# Patient Record
Sex: MALE | Race: WHITE | Employment: FULL TIME | URBAN - METROPOLITAN AREA
[De-identification: names, ages, dates, MRNs, and addresses within clinical notes are randomized per-mention and may not be internally consistent; named-entity substitution may affect disease eponyms.]

---

## 2018-05-15 ENCOUNTER — OFFICE VISIT (OUTPATIENT)
Dept: VASCULAR SURGERY | Age: 63
End: 2018-05-15
Payer: COMMERCIAL

## 2018-05-15 ENCOUNTER — HOSPITAL ENCOUNTER (OUTPATIENT)
Dept: CARDIOLOGY | Age: 63
Discharge: HOME | End: 2018-05-15
Payer: COMMERCIAL

## 2018-05-15 DIAGNOSIS — I71.40 AAA (ABDOMINAL AORTIC ANEURYSM) WITHOUT RUPTURE: Primary | ICD-10-CM

## 2018-05-15 DIAGNOSIS — I71.40 ABDOMINAL AORTIC ANEURYSM WITHOUT RUPTURE: ICD-10-CM

## 2018-05-15 PROCEDURE — 99213 OFFICE O/P EST LOW 20 MIN: CPT | Performed by: SURGERY

## 2018-05-15 PROCEDURE — 93978 VASCULAR STUDY: CPT

## 2018-05-15 RX ORDER — LEVOTHYROXINE SODIUM 0.05 MG/1
75 TABLET ORAL DAILY
Refills: 1 | COMMUNITY
Start: 2018-03-13

## 2018-09-19 ENCOUNTER — HOSPITAL ENCOUNTER (OUTPATIENT)
Age: 63
Discharge: HOME | End: 2018-09-21
Payer: COMMERCIAL

## 2018-09-19 LAB
ALBUMIN SERPL-MCNC: 4.2 G/DL (ref 3.5–5.2)
ALP BLD-CCNC: 47 U/L (ref 40–129)
ALT SERPL-CCNC: 86 U/L (ref 0–40)
ANION GAP SERPL CALCULATED.3IONS-SCNC: 17 MMOL/L (ref 7–16)
AST SERPL-CCNC: 69 U/L (ref 0–39)
BASOPHILS ABSOLUTE: 0.08 E9/L (ref 0–0.2)
BASOPHILS RELATIVE PERCENT: 1 % (ref 0–2)
BILIRUB SERPL-MCNC: <0.2 MG/DL (ref 0–1.2)
BUN BLDV-MCNC: 16 MG/DL (ref 8–23)
CALCIUM SERPL-MCNC: 8.8 MG/DL (ref 8.6–10.2)
CHLORIDE BLD-SCNC: 102 MMOL/L (ref 98–107)
CHOLESTEROL, TOTAL: 156 MG/DL (ref 0–199)
CO2: 23 MMOL/L (ref 22–29)
CREAT SERPL-MCNC: 0.9 MG/DL (ref 0.7–1.2)
EOSINOPHILS ABSOLUTE: 0.19 E9/L (ref 0.05–0.5)
EOSINOPHILS RELATIVE PERCENT: 2.3 % (ref 0–6)
GFR AFRICAN AMERICAN: >60
GFR NON-AFRICAN AMERICAN: >60 ML/MIN/1.73
GLUCOSE BLD-MCNC: 116 MG/DL (ref 74–109)
HCT VFR BLD CALC: 48.8 % (ref 37–54)
HDLC SERPL-MCNC: 36 MG/DL
HEMOGLOBIN: 16.2 G/DL (ref 12.5–16.5)
IMMATURE GRANULOCYTES #: 0.06 E9/L
IMMATURE GRANULOCYTES %: 0.7 % (ref 0–5)
LDL CHOLESTEROL CALCULATED: 87 MG/DL (ref 0–99)
LYMPHOCYTES ABSOLUTE: 2.42 E9/L (ref 1.5–4)
LYMPHOCYTES RELATIVE PERCENT: 29.9 % (ref 20–42)
MCH RBC QN AUTO: 33.3 PG (ref 26–35)
MCHC RBC AUTO-ENTMCNC: 33.2 % (ref 32–34.5)
MCV RBC AUTO: 100.4 FL (ref 80–99.9)
MONOCYTES ABSOLUTE: 0.72 E9/L (ref 0.1–0.95)
MONOCYTES RELATIVE PERCENT: 8.9 % (ref 2–12)
NEUTROPHILS ABSOLUTE: 4.62 E9/L (ref 1.8–7.3)
NEUTROPHILS RELATIVE PERCENT: 57.2 % (ref 43–80)
PDW BLD-RTO: 12.6 FL (ref 11.5–15)
PLATELET # BLD: 225 E9/L (ref 130–450)
PMV BLD AUTO: 10.1 FL (ref 7–12)
POTASSIUM SERPL-SCNC: 4.5 MMOL/L (ref 3.5–5)
PROSTATE SPECIFIC ANTIGEN: 5.63 NG/ML (ref 0–4)
RBC # BLD: 4.86 E12/L (ref 3.8–5.8)
SODIUM BLD-SCNC: 142 MMOL/L (ref 132–146)
TOTAL PROTEIN: 6.8 G/DL (ref 6.4–8.3)
TRIGL SERPL-MCNC: 167 MG/DL (ref 0–149)
TSH SERPL DL<=0.05 MIU/L-ACNC: 3.21 UIU/ML (ref 0.27–4.2)
VITAMIN D 25-HYDROXY: 39 NG/ML (ref 30–100)
VLDLC SERPL CALC-MCNC: 33 MG/DL
WBC # BLD: 8.1 E9/L (ref 4.5–11.5)

## 2018-09-19 PROCEDURE — 84443 ASSAY THYROID STIM HORMONE: CPT

## 2018-09-19 PROCEDURE — 80061 LIPID PANEL: CPT

## 2018-09-19 PROCEDURE — 85025 COMPLETE CBC W/AUTO DIFF WBC: CPT

## 2018-09-19 PROCEDURE — 80053 COMPREHEN METABOLIC PANEL: CPT

## 2018-09-19 PROCEDURE — 82306 VITAMIN D 25 HYDROXY: CPT

## 2018-09-19 PROCEDURE — G0103 PSA SCREENING: HCPCS

## 2019-03-27 ENCOUNTER — HOSPITAL ENCOUNTER (OUTPATIENT)
Age: 64
Discharge: HOME | End: 2019-03-29
Payer: COMMERCIAL

## 2019-03-27 LAB
ALBUMIN SERPL-MCNC: 4.4 G/DL (ref 3.5–5.2)
ALP BLD-CCNC: 49 U/L (ref 40–129)
ALT SERPL-CCNC: 24 U/L (ref 0–40)
ANION GAP SERPL CALCULATED.3IONS-SCNC: 18 MMOL/L (ref 7–16)
AST SERPL-CCNC: 20 U/L (ref 0–39)
BASOPHILS ABSOLUTE: 0.07 E9/L (ref 0–0.2)
BASOPHILS RELATIVE PERCENT: 0.8 % (ref 0–2)
BILIRUB SERPL-MCNC: 0.6 MG/DL (ref 0–1.2)
BUN BLDV-MCNC: 13 MG/DL (ref 8–23)
CALCIUM SERPL-MCNC: 8.8 MG/DL (ref 8.6–10.2)
CHLORIDE BLD-SCNC: 105 MMOL/L (ref 98–107)
CHOLESTEROL, TOTAL: 206 MG/DL (ref 0–199)
CO2: 22 MMOL/L (ref 22–29)
CREAT SERPL-MCNC: 1.1 MG/DL (ref 0.7–1.2)
EOSINOPHILS ABSOLUTE: 0.25 E9/L (ref 0.05–0.5)
EOSINOPHILS RELATIVE PERCENT: 2.9 % (ref 0–6)
GFR AFRICAN AMERICAN: >60
GFR NON-AFRICAN AMERICAN: >60 ML/MIN/1.73
GLUCOSE BLD-MCNC: 113 MG/DL (ref 74–99)
HCT VFR BLD CALC: 50.1 % (ref 37–54)
HDLC SERPL-MCNC: 39 MG/DL
HEMOGLOBIN: 16.6 G/DL (ref 12.5–16.5)
IMMATURE GRANULOCYTES #: 0.09 E9/L
IMMATURE GRANULOCYTES %: 1.1 % (ref 0–5)
LDL CHOLESTEROL CALCULATED: 137 MG/DL (ref 0–99)
LYMPHOCYTES ABSOLUTE: 2.57 E9/L (ref 1.5–4)
LYMPHOCYTES RELATIVE PERCENT: 30.1 % (ref 20–42)
MCH RBC QN AUTO: 33.7 PG (ref 26–35)
MCHC RBC AUTO-ENTMCNC: 33.1 % (ref 32–34.5)
MCV RBC AUTO: 101.6 FL (ref 80–99.9)
MONOCYTES ABSOLUTE: 0.7 E9/L (ref 0.1–0.95)
MONOCYTES RELATIVE PERCENT: 8.2 % (ref 2–12)
NEUTROPHILS ABSOLUTE: 4.85 E9/L (ref 1.8–7.3)
NEUTROPHILS RELATIVE PERCENT: 56.9 % (ref 43–80)
PDW BLD-RTO: 13.1 FL (ref 11.5–15)
PLATELET # BLD: 247 E9/L (ref 130–450)
PMV BLD AUTO: 10.4 FL (ref 7–12)
POTASSIUM SERPL-SCNC: 4.4 MMOL/L (ref 3.5–5)
PROSTATE SPECIFIC ANTIGEN: 5.79 NG/ML (ref 0–4)
RBC # BLD: 4.93 E12/L (ref 3.8–5.8)
SODIUM BLD-SCNC: 145 MMOL/L (ref 132–146)
TOTAL PROTEIN: 7 G/DL (ref 6.4–8.3)
TRIGL SERPL-MCNC: 151 MG/DL (ref 0–149)
TSH SERPL DL<=0.05 MIU/L-ACNC: 2.53 UIU/ML (ref 0.27–4.2)
VITAMIN D 25-HYDROXY: 41 NG/ML (ref 30–100)
VLDLC SERPL CALC-MCNC: 30 MG/DL
WBC # BLD: 8.5 E9/L (ref 4.5–11.5)

## 2019-03-27 PROCEDURE — 80061 LIPID PANEL: CPT

## 2019-03-27 PROCEDURE — 85025 COMPLETE CBC W/AUTO DIFF WBC: CPT

## 2019-03-27 PROCEDURE — 80053 COMPREHEN METABOLIC PANEL: CPT

## 2019-03-27 PROCEDURE — 82306 VITAMIN D 25 HYDROXY: CPT

## 2019-03-27 PROCEDURE — 84443 ASSAY THYROID STIM HORMONE: CPT

## 2019-03-27 PROCEDURE — G0103 PSA SCREENING: HCPCS

## 2019-05-02 ENCOUNTER — HOSPITAL ENCOUNTER (OUTPATIENT)
Age: 64
Discharge: HOME | End: 2019-05-04
Payer: COMMERCIAL

## 2019-05-02 LAB — PROSTATE SPECIFIC ANTIGEN: 6.2 NG/ML (ref 0–4)

## 2019-05-02 PROCEDURE — 84153 ASSAY OF PSA TOTAL: CPT

## 2019-05-21 ENCOUNTER — HOSPITAL ENCOUNTER (OUTPATIENT)
Dept: CARDIOLOGY | Age: 64
Discharge: HOME | End: 2019-05-21
Payer: COMMERCIAL

## 2019-05-21 ENCOUNTER — OFFICE VISIT (OUTPATIENT)
Dept: VASCULAR SURGERY | Age: 64
End: 2019-05-21
Payer: COMMERCIAL

## 2019-05-21 DIAGNOSIS — I71.40 ABDOMINAL AORTIC ANEURYSM WITHOUT RUPTURE: Primary | ICD-10-CM

## 2019-05-21 DIAGNOSIS — I71.40 AAA (ABDOMINAL AORTIC ANEURYSM) WITHOUT RUPTURE: ICD-10-CM

## 2019-05-21 PROCEDURE — 93978 VASCULAR STUDY: CPT

## 2019-05-21 PROCEDURE — 99213 OFFICE O/P EST LOW 20 MIN: CPT | Performed by: SURGERY

## 2019-05-21 NOTE — PROGRESS NOTES
[facility] - [facility]    This is a pre read worksheet - prior to official physician interpretation    Purnima Magallon  1955  Date of study: 5/21/19    Indication for study:  AAA  Study :  Aortic Ultrasound    Diameter Aorta:     Proximal:   cm     Mid:   cm     Distal:  4.0 x 4.1 cm     Right iliac: 1.4 x 1.4 cm     Left iliac: 1.3 x 1.3 cm    Additional comments:       LAST STUDY  5/15/2018  3.9 cm  Rt 1.3  Lt 1.3

## 2019-05-21 NOTE — PROGRESS NOTES
Vascular Surgery Outpatient Progress Note      Chief Complaint   Patient presents with    Circulatory Problem     Follow up AAA       HISTORY OF PRESENT ILLNESS:                The patient is a 70 y.o. male who returns for follow-up evaluation of his abdominal aortic aneurysm. He denies any recent hospital admission, major illness, or surgery since the last office visit. He is currently undergoing workup for urological issues. Past Medical History:        Diagnosis Date    Full dentures     Hyperlipidemia     Vitamin D deficiency      Past Surgical History:        Procedure Laterality Date    COLONOSCOPY      TONSILLECTOMY      TOOTH EXTRACTION      WISDOM TOOTH EXTRACTION       Current Medications:   Prior to Admission medications    Medication Sig Start Date End Date Taking? Authorizing Provider   levothyroxine (SYNTHROID) 50 MCG tablet Take 50 mcg by mouth daily  3/13/18  Yes Historical Provider, MD   simvastatin (ZOCOR) 20 MG tablet Take 20 mg by mouth daily  4/7/16  Yes Historical Provider, MD   vitamin D 1000 units CAPS Take 1,000 Units by mouth daily    Yes Historical Provider, MD   Omega-3 Fatty Acids (FISH OIL) 1000 MG CPDR Take 1,000 mg by mouth    Historical Provider, MD   aspirin EC 81 MG EC tablet Take 81 mg by mouth as needed for Pain    Historical Provider, MD     Allergies:  Patient has no known allergies.     Social History     Socioeconomic History    Marital status:      Spouse name: Not on file    Number of children: Not on file    Years of education: Not on file    Highest education level: Not on file   Occupational History    Not on file   Social Needs    Financial resource strain: Not on file    Food insecurity:     Worry: Not on file     Inability: Not on file    Transportation needs:     Medical: Not on file     Non-medical: Not on file   Tobacco Use    Smoking status: Current Every Day Smoker     Packs/day: 0.50     Types: Cigarettes    Smokeless tobacco: Never Used   Substance and Sexual Activity    Alcohol use: Yes     Comment: 2-3 beers per day    Drug use: Not on file    Sexual activity: Not on file   Lifestyle    Physical activity:     Days per week: Not on file     Minutes per session: Not on file    Stress: Not on file   Relationships    Social connections:     Talks on phone: Not on file     Gets together: Not on file     Attends Advent service: Not on file     Active member of club or organization: Not on file     Attends meetings of clubs or organizations: Not on file     Relationship status: Not on file    Intimate partner violence:     Fear of current or ex partner: Not on file     Emotionally abused: Not on file     Physically abused: Not on file     Forced sexual activity: Not on file   Other Topics Concern    Not on file   Social History Narrative    Not on file        Family History   Problem Relation Age of Onset    Heart Disease Mother     Other Father         MS    Cancer Sister 72       REVIEW OF SYSTEMS (New symptoms):    Eyes:      Blurred vision:  No [x]/Yes []               Diplopia:   No [x]/Yes []               Vision loss:       No [x]/Yes []   Ears, nose, throat:             Hearing loss:    No [x]/Yes []      Vertigo:   No [x]/Yes []                       Swallowing problem:  No [x]/Yes []               Nose bleeds:   No [x]/Yes []      Voice hoarseness:  No [x]/Yes []  Respiratory:             Cough:   No [x]/Yes []      Pleuritic chest pain:  No [x]/Yes []                        Dyspnea:   No [x]/Yes []      Wheezing:   No [x]/Yes []  Cardiovascular:             Angina:   No [x]/Yes []      Palpitations:   No [x]/Yes []          Claudication:    No [x]/Yes []      Leg swelling:   No [x]/Yes []  Gastrointestinal:             Nausea or vomiting:  No [x]/Yes []               Abdominal pain:  No [x]/Yes []                     Intestinal bleeding: No [x]/Yes []  Musculoskeletal:             Leg pain:   No [x]/Yes []      Back pain: No [x]/Yes []                    Weakness:   No [x]/Yes []  Neurologic:             Numbness:   No [x]/Yes []      Paralysis:   No [x]/Yes []                       Headaches:   No [x]/Yes []  Hematologic, lymphatic:   Anemia:   No [x]/Yes []              Bleeding or bruising:  No [x]/Yes []              Fevers or chills: No [x]/Yes []  Endocrine:             Temp intolerance:   No [x]/Yes []                       Polydipsia, polyuria:  No [x]/Yes []  Skin:              Rash:    No [x]/Yes []      Ulcers:   No [x]/Yes []              Abnorm pigment: No [x]/Yes []  :              Frequency/urgency:  No [x]/Yes []      Hematuria:    No []/Yes [x]                      Incontinence:    No [x]/Yes []    PHYSICAL EXAM:  There were no vitals filed for this visit. General Appearance: alert and oriented to person, place and time, in no acute distress, well developed and well- nourished  Neurologic: no cranial nerve deficit, speech normal  Head: normocephalic and atraumatic  Eyes: extraocular eye movements intact, conjunctivae normal  ENT: external ear and ear canal normal bilaterally, nose without deformity  Pulmonary/Chest: normal air movement, no respiratory distress  Cardiovascular: normal rate, regular rhythm  Abdomen: non-distended, no masses  Musculoskeletal: no joint deformity or tenderness  Extremities: no leg edema bilaterally  Skin: warm and dry, no rash or erythema    PULSE EXAM      Right      Left   Brachial     Radial 3 3   Femoral     Popliteal     Dorsalis Pedis     Posterior Tibial 3 3   (3=normal, 2=diminished, 1=barely palpable, 4=widened)    RADIOLOGY: SA today  AAA 4.1 cm  R IA 1.4 cm  L IA 1.3 cm     Problem List Items Addressed This Visit     Abdominal aortic aneurysm without rupture (HCC) - Primary    Relevant Orders    US DUP ABD PEL RETRO SCROT LIMITED        I reviewed the results of the testing with the patient. Stable abdominal aortic aneurysm.  I will plan to see him back in one year with

## 2019-06-14 ENCOUNTER — HOSPITAL ENCOUNTER (OUTPATIENT)
Age: 64
Discharge: HOME | End: 2019-06-16

## 2019-06-14 PROCEDURE — 88305 TISSUE EXAM BY PATHOLOGIST: CPT

## 2020-01-28 ENCOUNTER — HOSPITAL ENCOUNTER (OUTPATIENT)
Dept: RADIATION ONCOLOGY | Age: 65
Discharge: HOME | End: 2020-01-28
Payer: COMMERCIAL

## 2020-01-28 ENCOUNTER — TELEPHONE (OUTPATIENT)
Dept: CASE MANAGEMENT | Age: 65
End: 2020-01-28

## 2020-01-28 VITALS
OXYGEN SATURATION: 94 % | HEART RATE: 66 BPM | TEMPERATURE: 98.3 F | RESPIRATION RATE: 18 BRPM | SYSTOLIC BLOOD PRESSURE: 120 MMHG | DIASTOLIC BLOOD PRESSURE: 84 MMHG | WEIGHT: 191 LBS

## 2020-01-28 PROCEDURE — 99205 OFFICE O/P NEW HI 60 MIN: CPT | Performed by: RADIOLOGY

## 2020-01-28 PROCEDURE — 99205 OFFICE O/P NEW HI 60 MIN: CPT

## 2020-01-28 NOTE — PROGRESS NOTES
Jessica Drought  1955 75 y.o. Referring Physician: Dr Ana Morris    PCP: Ly Conroy,      There were no vitals filed for this visit. Wt Readings from Last 3 Encounters:   No data found for Wt        There is no height or weight on file to calculate BMI. Chief Complaint: No chief complaint on file. Cancer Staging  No matching staging information was found for the patient. Prior Radiation Therapy? NO    Concurrent Chemo/radiation? NO    Prior Chemotherapy? NO    Prior Hormonal Therapy? NO    Head and Neck Cancer? No, patient does NOT have HN cancer. Current Outpatient Medications   Medication Sig Dispense Refill    levothyroxine (SYNTHROID) 50 MCG tablet Take 50 mcg by mouth daily   1    simvastatin (ZOCOR) 20 MG tablet Take 20 mg by mouth daily   0    vitamin D 1000 units CAPS Take 1,000 Units by mouth daily        No current facility-administered medications for this encounter.         Past Medical History:   Diagnosis Date    Full dentures     Hyperlipidemia     Vitamin D deficiency        Past Surgical History:   Procedure Laterality Date    COLONOSCOPY      TONSILLECTOMY      TOOTH EXTRACTION      WISDOM TOOTH EXTRACTION         Family History   Problem Relation Age of Onset    Heart Disease Mother     Other Father         MS    Cancer Sister 72       Social History     Socioeconomic History    Marital status:      Spouse name: Not on file    Number of children: Not on file    Years of education: Not on file    Highest education level: Not on file   Occupational History    Not on file   Social Needs    Financial resource strain: Not on file    Food insecurity:     Worry: Not on file     Inability: Not on file    Transportation needs:     Medical: Not on file     Non-medical: Not on file   Tobacco Use    Smoking status: Current Every Day Smoker     Packs/day: 0.50     Types: Cigarettes    Smokeless tobacco: Never Used    Tobacco comment: 1-2 cigs in the am-the rest in the evening with beer   Substance and Sexual Activity    Alcohol use: Yes     Comment: 2-3 beers per day    Drug use: Never    Sexual activity: Not on file   Lifestyle    Physical activity:     Days per week: Not on file     Minutes per session: Not on file    Stress: Not on file   Relationships    Social connections:     Talks on phone: Not on file     Gets together: Not on file     Attends Tenriism service: Not on file     Active member of club or organization: Not on file     Attends meetings of clubs or organizations: Not on file     Relationship status: Not on file    Intimate partner violence:     Fear of current or ex partner: Not on file     Emotionally abused: Not on file     Physically abused: Not on file     Forced sexual activity: Not on file   Other Topics Concern    Not on file   Social History Narrative    Not on file     IPSS Questionnaire (AUA-7): Over the past month    1)  How often have you had a sensation of not emptying your bladder completely after you finish urinating? 1 - Less than 1 time in 5   2)  How often have you had to urinate again less than two hours after you finished urinating? 1 - Less than 1 time in 5   3)  How often have you found you stopped and started again several times when you urinated? 1 - Less than 1 time in 5   4) How difficult have you found it to postpone urination? 0 - Not at all   5) How often have you had a weak urinary stream?  0 - Not at all   6) How often have you had to push or strain to begin urination? 0 - Not at all   7) How many times did you most typically get up to urinate from the time you went to bed until the time you got up in the morning?   1 - 1 time   Total score:  0-7 mildly symptomatic    8-19 moderately symptomatic    20-35 severely symptomatic         Occupation:  at [employer]  Retired:  NO        REVIEW OF SYSTEMS: <<For Level 5, 10 or more systems>> TOTAL 0          Score of 0-1: No action  Score 2 or greater:  · For Non-Diabetic Patient: Recommend adding Ensure Complete 2 x daily and provide patient with Ensure wellness bag with coupons  · For Diabetic Patient: Recommend adding Glucerna Shake 2 x daily and provide patient with Glucerna Wellness bag with coupons  · Route to the dietitian via [address]    · Are you having  difficulty performing daily routine tasks  due to fatigue or weakness (ie: bathing/showering, dressing, housework, meal prep, work, child Luis Barter): No     · Do you have any arm flexibility/ROM restrictions, swelling or pain that limit activity: No     · Any changes in memory, attention/focus that impact daily activities: No     · Do you avoid participation in leisure/social activity due weakness, fatigue or pain: No     ARE ANY OF THE ABOVE ARE ANSWERED YES: No          PT ASSESSMENT FOR REFERRAL    · Have you had any recent falls in past 2 months: No     · Do you have difficulty  going up/down stairs: No     · Are you having difficulty walking: No     · Do you often hold onto furniture/environmental supports or feel off balance when you are walking: No     · Do you need to take rest breaks when you are walking: No     · Any pain on scale of 1-10 that limits your mobility: No 0/10    ARE ANY OF THE ABOVE ARE ANSWERED YES: No           LYMPHEDEMA SCREENING ASSESSMENT FOR PATIENTS WITH BREAST CANCER    The patient reports the following signs/symptoms of lymphedema: None    Please ask the provider to assess patient for lymphedema for any reported signs or symptoms so a referral to Lymphedema Therapy can be considered. PREHAB AUDIOLOGY REFERRAL    - Is patient planned to receive Cisplatin? No. This patient is not planned to start Cisplatin.     - Is patient planned to receive radiation therapy that may be directed toward auditory canals or nerves? No. Patient is not planned to start radiation therapy to auditory canals or nerves. - Is patient complaining of new onset hearing loss? No. Patient is not complaining of new onset hearing loss. Patient education given on radiation therapy to the prostate bed. The patient expresses understanding and acceptance of instructions.  Hesham Munoz 1/28/2020 2:35 PM           Hesham Munoz

## 2020-01-28 NOTE — TELEPHONE ENCOUNTER
Met with patient during his initial consultation with Dr. Jairon Da Silva for radiation therapy for his prostate (Sara 4+5=9) cancer diagnosis with  [facility]. Introduced myself and explained my role with cancer patients receiving treatment within our centers. Patient was friendly and receptive. States that all of his questions and concerns were fully addressed during her consultation appointment with the radiation therapy nurse and physician. Denies any problems with insurance coverage for medication or transportation for her daily treatments. 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 States that he still has his FMLA from his radical prostatectomy surgery 7/29/2019 at [facility]. Instructed him to discuss with his HR department if any additional paperwork is needed for intermittent FMLA for daily radiation therapy treatments and to bring in paperwork for me to assist with. Verbalizes understanding. Provided written literature ACS Booklet:  Prostate Cancer, ACS Booklet:  [facility], Water and Bowel prep directions prior to CT SIM and daily treatments, and my contact information. Instructed patient to call if any assistance is needed during his radiation therapy treatments. Patient appreciative of visit and information. CT Simulation with Dr. Jairon Da Silva scheduled for 1/31/2020 at [phone] at [facility]. Will continue to follow. DANIEL LimonW, RN, OCN  Oncology Nurse Navigator

## 2020-01-28 NOTE — CONSULTS
Radiation OncologyConsultation               Referring Physician: Regina Hussein MD      Primary Care Physician:Ramón Christiansen DO   Primary Urologist: Regina Hussein MD  Secondary urologist; Cyndie Hernandez MD    Chief Complaint: min post surgical stress incontinence which has pretty much resolved the past 2 months     Diagnosis: 76 y/o male with High risk adenocarcinoma of the prostate with pre treatment PSA of 6.2 ng/ml, 5/2/19, s/p RP with bilateral pelvic LN sampling, 7/29/19, revealing pT3b R SV+, pN0(0/9), GS 9(4+5), involving 30% of the gland, extensive 10 mm + bladder neck margin, 1st two post-op PSAs <0.01, 10/29/19, and <0.03 ng/ml, 1/8/20, now referred for a discussion of adjuvant radiation     ECOG Performance Status:1    HPI: Patient is a 76 y/o male whose PSA had been rising and being monitored, eventually rising to 6.2 by 5/2/2019. After a discussion with his urologist, Regina Hussein MD, he underwent TRUSP biopsy of the prostate. Pathology revealed adenocarcinoma of the prostate, GS 9 (4+5) involving 60% of 1 core taken from the right apex, and Davenport score of 8 involving <5% of 1 of 1 core also from the right apex, no PNI. After a negative metastatic workup, patient had an extensive discussion regarding his treatment option and was referred for a consultation regarding proceeding with radical prostatectomy with our urology colleague from the main campus of [facility], Cyndie Heranndez MD.  On 7/29/2019 Cyndie Hernandez MD performed the laparoscopic robotic-assisted retropubic radical prostatectomy with bilateral pelvic lymph node sampling. pathology revealed adenocarcinoma of the prostate Sara score of 9 (4+5) involving 30% of the entire prostate gland. There was evidence of extraprostatic extension and in nonfocal pattern in the right anterior apex mid and right posterior base with maximum depth of penetration of 2 mm.   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  9 lymph nodes removed within the pelvis (0/9) were negative for evidence of metastatic carcinoma there was no lymphovascular invasion and no mention of perineural invasion in the pathology report, final pathologic stage pT3b, pN0 with extensive and multifocal involved surgical margin, as much as 10 mm. Patient's the 2 postoperative PSAs have been at undetectable level of <0.01 NG/mL, 10/29/2019, and <0.03 NG/mL 1/8/2020. Dr. Memory Crigler sent off a post radical prostatectomy decipher and a score, which we currently do not have, came back as high risk for recurrence. Due to the multiple high risk features including extensively involved positive margin, patient is now referred to me for discussion of adjuvant external beam radiation therapy. Patient's postoperative period has been essentially unremarkable and he has had excellent partial recovery from his stress incontinence and does not wear a pad in his underpants. Currently, patient is continuing his Kegle exercises. he has full ED post surgically and is currently essentially sexually inactive, but has no interest in short-term ADT.   -----    Past Medical History:   Diagnosis Date    Full dentures     Hyperlipidemia     Vitamin D deficiency          Fertility discussion:  1- Patient not interested in sperm banking     Past Surgical History:   Procedure Laterality Date    COLONOSCOPY      TONSILLECTOMY      TOOTH EXTRACTION      WISDOM TOOTH EXTRACTION           Prior Radiation Therapy:  denies    Prior Chemotherapy:   denies    Prior Hormonal Treatment: pt is declining         Family History   Problem Relation Age of Onset    Heart Disease Mother     Other Father         MS    Cancer Sister 72       Current Outpatient Medications   Medication Sig Dispense Refill    levothyroxine (SYNTHROID) 50 MCG tablet Take 50 mcg by mouth daily   1    simvastatin (ZOCOR) 20 MG tablet Capillary refill takes 2 to 3 seconds. Coloration: Skin is not jaundiced or pale. Findings: No bruising, erythema, lesion or rash. Neurological:      General: No focal deficit present. Mental Status: He is alert and oriented to person, place, and time. Mental status is at baseline. Cranial Nerves: No cranial nerve deficit. Sensory: No sensory deficit. Motor: No weakness. Coordination: Coordination normal.      Gait: Gait normal.      Deep Tendon Reflexes: Reflexes normal.   Psychiatric:         Mood and Affect: Mood normal.         Behavior: Behavior normal.         Thought Content: Thought content normal.         Judgment: Judgment normal.           Imaging reviewed:    Pathology reviewed:  Original pathology from biopsy performed by the patient's urologist, Patricia Levin MD and the subsequent radical prostatectomy specimen from Matt Pompa MD were both reviewed and there is also documented in the history of present illness above      Assessment: 76 y/o male with High risk adenocarcinoma of the prostate with pre treatment PSA of 6.2 ng/ml, 5/2/19, s/p RP with bilateral pelvic LN sampling, 7/29/19, revealing pT3b R SV+, pN0(0/9), GS 9(4+5), involving 30% of the gland, extensive multifocal 10 mm + bladder neck margin, high post-op decipher score,  1st two post-op PSAs <0.01, 10/29/19, and <0.03 ng/ml, 1/8/20, now referred for a discussion of adjuvant radiation         Plan[de-identified] A very extensive discussion lasting approximately 90 minutes was performed on the patient greater than half this time was spent counseling him regarding his options. Given the multiple high risk features within the radical prostatectomy specimen, including, involvement of right seminal vesicles extensive involved surgical margin, perineural invasion and a postoperative decipher score which put him at a high risk for recurrence, patient is an appropriate candidate for utilization of adjuvant treatment.   I discussed the results of RTOG 0534/SPPORT trial with the patient and specifically discussed the roughly 88% biochemical progression free survival in the third arm of the trial where patients were treated to the draining pelvic lymphatics prostate of bed and seminal vesicles and received between 4-6 months of androgen deprivation therapy. However, patient has made it very clear to me that he has actually no interest in utilization of even short-term androgen deprivation treatment. He is, however,  very much interested in proceeding with adjuvant external beam radiation therapy as opposed to waiting until his radiation treatment becomes salvage. Our radiation plan, will consist of delivering 7020 cGy in 39 divided fractions delivered to the prostate fossa plus seminal vesicle remnants in 7.8 weeks at 180 cGy per fraction, with at least 4500 cGy of this total dosage during the first 25 days treatment being delivered also to the draining pelvic lymphatics. We will be utilizing daily cone beam CAT scan imaging for the purpose of localization in addition to VMAT technology to minimize toxicity to the surrounding structures. Toxicity of this treatment was extensively discussed with the patient.   This would include, but not necessarily be limited to generalized fatigue, immune suppression, radiation-induced dermatitis consisting of erythema pruritus hyperpigmentation dry and moist desquamation in the suprapubic region intergluteal fold perianal area and the medial aspect of the gluteal regions, radiation-induced urinary obstructive symptoms consisting of increased urinary frequency hesitancy urgency weakness of the stream intermittency of the stream dribbling dysuria and incomplete emptying/post void residual, and a resurgence of post surgical stress incontinence,radiation-induced gastroenteritis consisting of increased crampy flatulence loose frequent bowel movements and possible watery diarrhea, and possible radiation-induced proctitis leading to bright red blood per rectum and pruritis/ pain during bowel movements. Long-term, it is our expectation the patient with recover from these acute symptoms within a few weeks after radiation has been completed, but he might be at increased risk for delayed bright red blood per rectum about a year and half after completion of treatment should he have an unusually hard bouts of constipation in addition to at least a 1.5% chance of risk of development of secondary neoplastic processes over course the next 10-15 years. Patient signed a consent agreeing to proceed with radiation treatment. He will undergo a radiation mapping CAT scan simulation with a full bladder and empty rectum and prep instructions were given to him by our staff and myself and we provided the plastic bottle please measured which patient will be drinking water from within an hour and half prior to radiation mapping CAT scan simulation. We thank you again for allowing us to participate in the care of this patient. Sincerely,  Blanca Gaffney MD  Electronically signed by Blanca Gaffney MD on 1/28/20 at 5:23 PM    NOTE: This report was transcribed using voice recognition software. Every effort was made to ensure accuracy; however, inadvertent computerized transcriptionerrors may be present.

## 2020-01-31 ENCOUNTER — HOSPITAL ENCOUNTER (OUTPATIENT)
Dept: RADIATION ONCOLOGY | Age: 65
Discharge: HOME | End: 2020-01-31
Attending: RADIOLOGY
Payer: COMMERCIAL

## 2020-01-31 PROCEDURE — 77334 RADIATION TREATMENT AID(S): CPT | Performed by: RADIOLOGY

## 2020-01-31 PROCEDURE — 77290 THER RAD SIMULAJ FIELD CPLX: CPT | Performed by: RADIOLOGY

## 2020-02-19 ENCOUNTER — HOSPITAL ENCOUNTER (OUTPATIENT)
Dept: RADIATION ONCOLOGY | Age: 65
Discharge: HOME | End: 2020-02-19
Attending: RADIOLOGY
Payer: COMMERCIAL

## 2020-02-19 PROCEDURE — 77300 RADIATION THERAPY DOSE PLAN: CPT | Performed by: RADIOLOGY

## 2020-02-19 PROCEDURE — 77301 RADIOTHERAPY DOSE PLAN IMRT: CPT | Performed by: RADIOLOGY

## 2020-02-19 PROCEDURE — 77338 DESIGN MLC DEVICE FOR IMRT: CPT | Performed by: RADIOLOGY

## 2020-02-24 ENCOUNTER — HOSPITAL ENCOUNTER (OUTPATIENT)
Dept: RADIATION ONCOLOGY | Age: 65
Discharge: HOME | End: 2020-02-24
Attending: RADIOLOGY
Payer: COMMERCIAL

## 2020-02-24 PROCEDURE — 77385 HC NTSTY MODUL RAD TX DLVR SMPL: CPT | Performed by: RADIOLOGY

## 2020-02-25 ENCOUNTER — HOSPITAL ENCOUNTER (OUTPATIENT)
Dept: RADIATION ONCOLOGY | Age: 65
Discharge: HOME | End: 2020-02-25
Attending: RADIOLOGY
Payer: COMMERCIAL

## 2020-02-25 PROCEDURE — 77385 HC NTSTY MODUL RAD TX DLVR SMPL: CPT | Performed by: RADIOLOGY

## 2020-02-26 ENCOUNTER — HOSPITAL ENCOUNTER (OUTPATIENT)
Dept: RADIATION ONCOLOGY | Age: 65
Discharge: HOME | End: 2020-02-26
Attending: RADIOLOGY
Payer: COMMERCIAL

## 2020-02-26 PROCEDURE — 77385 HC NTSTY MODUL RAD TX DLVR SMPL: CPT | Performed by: RADIOLOGY

## 2020-02-27 ENCOUNTER — HOSPITAL ENCOUNTER (OUTPATIENT)
Dept: RADIATION ONCOLOGY | Age: 65
Discharge: HOME | End: 2020-02-27
Attending: RADIOLOGY
Payer: COMMERCIAL

## 2020-02-27 VITALS
HEART RATE: 68 BPM | DIASTOLIC BLOOD PRESSURE: 84 MMHG | TEMPERATURE: 98.1 F | RESPIRATION RATE: 16 BRPM | OXYGEN SATURATION: 98 % | WEIGHT: 188 LBS | SYSTOLIC BLOOD PRESSURE: 145 MMHG

## 2020-02-27 PROCEDURE — 77385 HC NTSTY MODUL RAD TX DLVR SMPL: CPT | Performed by: RADIOLOGY

## 2020-02-27 NOTE — PROGRESS NOTES
Kaleen Mode  2/27/2020  3:16 PM      Chief Complaint   Patient presents with    Prostate Cancer          Wt Readings from Last 3 Encounters:   01/28/20 191 lb (86.6 kg)       Comments: Dose 720 cGy to the prostatic bed. Patient is doing well without any complaints. He denies any dysuria frequency or hematuria. He has no nocturia or incontinence. He denies any diarrhea or blood in the stools. He has no pain, weight loss or fatigue. On examination the skin over the treated area looks good.     Tolerating treatment well      Plan: Radiation to continue as planned      Electronically signed by Marlo Orlando MD on 2/27/20 at 3:17 PM Airway patent, nasal mucosa clear, mouth with normal mucosa. Throat has no vesicles, no oropharyngeal exudates and uvula is midline. TMs impacted with cerumen bilaterally. No pain on insertion of otoscope. No cervical lymphadenopathy.

## 2020-02-27 NOTE — PROGRESS NOTES
Maria Elena Larger  2020  Wt Readings from Last 3 Encounters:   20 188 lb (85.3 kg)   20 191 lb (86.6 kg)     There is no height or weight on file to calculate BMI. Treatment Area: prostate bed     Patient was seen today for weekly visit. Comfort Alteration  KPS:100%  Fatigue: None    Nutritional Alteration  Anorexia: No  Nausea: No  Vomiting: No     Elimination Alterations  Constipation: no  Diarrhea:  no  Urinary Frequency/Urgency: No  Urinary Retention: Yes  Dysuria: Yes  Urinary Incontinence: Yes   Dribbling at times   Proctitis: No  Nocturia: No #/night: 0     Skin Alteration   Sensation: NA     Radiation Dermatitis: NA pt educated on importance of protecting skin by moisturizing. Pt is using Udder cream.     Emotional  Coping: effective    Sexuality Alteration  Not currently active. Injury, potential bleeding or infection:     Lab Results   Component Value Date    WBC 8.5 2019     2019         BP (!) 145/84   Pulse 68   Temp 98.1 °F (36.7 °C)   Resp 16   Wt 188 lb (85.3 kg)   SpO2 98%   BP within normal range? no   -if no, manually recheck in 5-10 min  NEW BP readin/83    BP within normal range? no   -if no, notify attending provider for further instruction     Pt instructed to update PCP. Assessment/Plan:   Pt completed  fractions; 720/4500. Tolerating well.      Kevin Sanders

## 2020-02-28 ENCOUNTER — HOSPITAL ENCOUNTER (OUTPATIENT)
Dept: RADIATION ONCOLOGY | Age: 65
Discharge: HOME | End: 2020-02-28
Attending: RADIOLOGY
Payer: COMMERCIAL

## 2020-02-28 PROCEDURE — 77385 HC NTSTY MODUL RAD TX DLVR SMPL: CPT | Performed by: RADIOLOGY

## 2020-02-28 PROCEDURE — 77336 RADIATION PHYSICS CONSULT: CPT | Performed by: RADIOLOGY

## 2020-03-02 ENCOUNTER — HOSPITAL ENCOUNTER (OUTPATIENT)
Dept: RADIATION ONCOLOGY | Age: 65
Discharge: HOME | End: 2020-03-02
Attending: RADIOLOGY
Payer: COMMERCIAL

## 2020-03-02 PROCEDURE — 77385 HC NTSTY MODUL RAD TX DLVR SMPL: CPT | Performed by: RADIOLOGY

## 2020-03-03 ENCOUNTER — HOSPITAL ENCOUNTER (OUTPATIENT)
Dept: RADIATION ONCOLOGY | Age: 65
Discharge: HOME | End: 2020-03-03
Attending: RADIOLOGY
Payer: COMMERCIAL

## 2020-03-03 PROCEDURE — 77385 HC NTSTY MODUL RAD TX DLVR SMPL: CPT | Performed by: RADIOLOGY

## 2020-03-04 ENCOUNTER — HOSPITAL ENCOUNTER (OUTPATIENT)
Dept: RADIATION ONCOLOGY | Age: 65
Discharge: HOME | End: 2020-03-04
Attending: RADIOLOGY
Payer: COMMERCIAL

## 2020-03-04 PROCEDURE — 77385 HC NTSTY MODUL RAD TX DLVR SMPL: CPT | Performed by: RADIOLOGY

## 2020-03-05 ENCOUNTER — HOSPITAL ENCOUNTER (OUTPATIENT)
Dept: RADIATION ONCOLOGY | Age: 65
Discharge: HOME | End: 2020-03-05
Attending: RADIOLOGY
Payer: COMMERCIAL

## 2020-03-05 VITALS
TEMPERATURE: 98.7 F | HEART RATE: 70 BPM | OXYGEN SATURATION: 97 % | DIASTOLIC BLOOD PRESSURE: 81 MMHG | SYSTOLIC BLOOD PRESSURE: 159 MMHG | WEIGHT: 188.8 LBS | RESPIRATION RATE: 16 BRPM

## 2020-03-05 PROCEDURE — 77385 HC NTSTY MODUL RAD TX DLVR SMPL: CPT | Performed by: RADIOLOGY

## 2020-03-06 ENCOUNTER — HOSPITAL ENCOUNTER (OUTPATIENT)
Dept: RADIATION ONCOLOGY | Age: 65
Discharge: HOME | End: 2020-03-06
Attending: RADIOLOGY
Payer: COMMERCIAL

## 2020-03-06 PROCEDURE — 77385 HC NTSTY MODUL RAD TX DLVR SMPL: CPT | Performed by: RADIOLOGY

## 2020-03-06 PROCEDURE — 77336 RADIATION PHYSICS CONSULT: CPT | Performed by: RADIOLOGY

## 2020-03-09 ENCOUNTER — HOSPITAL ENCOUNTER (OUTPATIENT)
Dept: RADIATION ONCOLOGY | Age: 65
Discharge: HOME | End: 2020-03-09
Attending: RADIOLOGY
Payer: COMMERCIAL

## 2020-03-09 PROCEDURE — 77385 HC NTSTY MODUL RAD TX DLVR SMPL: CPT | Performed by: RADIOLOGY

## 2020-03-10 ENCOUNTER — HOSPITAL ENCOUNTER (OUTPATIENT)
Dept: RADIATION ONCOLOGY | Age: 65
Discharge: HOME | End: 2020-03-10
Attending: RADIOLOGY
Payer: COMMERCIAL

## 2020-03-10 PROCEDURE — 77385 HC NTSTY MODUL RAD TX DLVR SMPL: CPT | Performed by: RADIOLOGY

## 2020-03-11 ENCOUNTER — HOSPITAL ENCOUNTER (OUTPATIENT)
Dept: RADIATION ONCOLOGY | Age: 65
Discharge: HOME | End: 2020-03-11
Attending: RADIOLOGY
Payer: COMMERCIAL

## 2020-03-11 PROCEDURE — 77385 HC NTSTY MODUL RAD TX DLVR SMPL: CPT | Performed by: RADIOLOGY

## 2020-03-12 ENCOUNTER — HOSPITAL ENCOUNTER (OUTPATIENT)
Dept: RADIATION ONCOLOGY | Age: 65
Discharge: HOME | End: 2020-03-12
Attending: RADIOLOGY
Payer: COMMERCIAL

## 2020-03-12 VITALS
OXYGEN SATURATION: 98 % | TEMPERATURE: 98 F | WEIGHT: 184.8 LBS | SYSTOLIC BLOOD PRESSURE: 158 MMHG | RESPIRATION RATE: 16 BRPM | DIASTOLIC BLOOD PRESSURE: 83 MMHG

## 2020-03-12 PROCEDURE — 77385 HC NTSTY MODUL RAD TX DLVR SMPL: CPT | Performed by: RADIOLOGY

## 2020-03-12 NOTE — PROGRESS NOTES
Parisa Krishna Francisca  3/12/2020  Wt Readings from Last 3 Encounters:   20 184 lb 12.8 oz (83.8 kg)   20 188 lb 12.8 oz (85.6 kg)   20 188 lb (85.3 kg)     There is no height or weight on file to calculate BMI. Treatment Area: prostate bed, lymph nodes     Patient was seen today for weekly visit. Comfort Alteration  KPS:100%  Fatigue: None    Nutritional Alteration  Anorexia: No  Nausea: No  Vomiting: No     Elimination Alterations  Constipation: no  Diarrhea:  no  Urinary Frequency/Urgency: No  Urinary Retention: No  Dysuria: No  Urinary Incontinence: No   Proctitis: No  Nocturia: Yes #/night: 1     Skin Alteration   Sensation:NA Pt educated on importance of protecting skin using moisturizer. Voiced understanding. Radiation Dermatitis:  NA     Emotional  Coping: effective    Sexuality Alteration  NA     Injury, potential bleeding or infection: NA     Lab Results   Component Value Date    WBC 8.5 2019     2019         BP (!) 158/83   Temp 98 °F (36.7 °C) (Oral)   Resp 16   Wt 184 lb 12.8 oz (83.8 kg)   SpO2 98%   BP within normal range? no   -if no, manually recheck in 5-10 min  NEW BP readin/80   BP within normal range?  No    -if no, notify attending provider for further instruction      Assessment/Plan: Pt completed 15/25 treatments; 2520/4500  Tolerating well/     Kecia Ricketts

## 2020-03-12 NOTE — PROGRESS NOTES
Kwaku Lisandra  3/12/2020  4:53 PM        Wt Readings from Last 3 Encounters:   03/12/20 184 lb 12.8 oz (83.8 kg)   03/05/20 188 lb 12.8 oz (85.6 kg)   02/27/20 188 lb (85.3 kg)       Subjective: 25.2 Portillo to the prostate bed pus seminal vesicle remanence and draining lymphatics out of a planned total dose of 70.2 Yessirachel Lawson tolerating radiation therapy well has little to no stress incontinence, has started some loosening of his bowels without any actual neel diarrhea he is also noticing some increased flatulence using Aquaphor predominately daily but occasionally twice daily with no appreciation of any skin irritation    Objective: Skin: Starting to develop some pinkish erythema in the suprapubic area, in addition he also has pinkish erythema all along the entire length of the intergluteal fold and in the bilateral medial gluteal region in the leslie-IGF area, he has dusky erythema of the perianal region-for more intense than the rest of the IGF   Abdomen: Nontender nondistended minimal hyperactive bowel sounds      Assessment: Tolerating radiation therapy without androgen deprivation treatment in the adjuvant setting due to high risk features post radical prostatectomy      Plan: Continue current management, advised the patient that he should purchase some Gas-X and Imodium from his local pharmacy. In addition, I think he needs to redouble his effort to increase the frequency of Aquaphor application to the perianal area where he has evidence of pretty significant dusky erythema.       Electronically signed by Cassidy Abdul MD on 3/12/20 at 4:53 PM EDT

## 2020-03-13 ENCOUNTER — HOSPITAL ENCOUNTER (OUTPATIENT)
Dept: RADIATION ONCOLOGY | Age: 65
Discharge: HOME | End: 2020-03-13
Attending: RADIOLOGY
Payer: COMMERCIAL

## 2020-03-13 PROCEDURE — 77336 RADIATION PHYSICS CONSULT: CPT | Performed by: RADIOLOGY

## 2020-03-13 PROCEDURE — 77385 HC NTSTY MODUL RAD TX DLVR SMPL: CPT | Performed by: RADIOLOGY

## 2020-03-16 ENCOUNTER — HOSPITAL ENCOUNTER (OUTPATIENT)
Dept: RADIATION ONCOLOGY | Age: 65
Discharge: HOME | End: 2020-03-16
Attending: RADIOLOGY
Payer: COMMERCIAL

## 2020-03-16 PROCEDURE — 77385 HC NTSTY MODUL RAD TX DLVR SMPL: CPT | Performed by: RADIOLOGY

## 2020-03-17 ENCOUNTER — HOSPITAL ENCOUNTER (OUTPATIENT)
Dept: RADIATION ONCOLOGY | Age: 65
Discharge: HOME | End: 2020-03-17
Attending: RADIOLOGY
Payer: COMMERCIAL

## 2020-03-17 PROCEDURE — 77385 HC NTSTY MODUL RAD TX DLVR SMPL: CPT | Performed by: RADIOLOGY

## 2020-03-18 ENCOUNTER — TELEPHONE (OUTPATIENT)
Dept: CASE MANAGEMENT | Age: 65
End: 2020-03-18

## 2020-03-18 ENCOUNTER — APPOINTMENT (OUTPATIENT)
Dept: RADIATION ONCOLOGY | Age: 65
End: 2020-03-18
Attending: RADIOLOGY
Payer: COMMERCIAL

## 2020-03-18 PROCEDURE — 77385 HC NTSTY MODUL RAD TX DLVR SMPL: CPT | Performed by: RADIOLOGY

## 2020-03-18 NOTE — TELEPHONE ENCOUNTER
Met with patient briefly following his daily radiation therapy treatment for follow up. Patient has had 18 treatments so far. Upon inquiring, states that he is doing well with the treatments. Denies any diarrhea and urination changes at this time. Patient to see Dr. Garry Taylor and RT RN for weekly follow up tomorrow. Provided support and encouragement. Denies any problems with transportation for treatment or current needs for assistance from [facility]. Patient appreciative of visit. Will continue to follow as needed. Angelique Leo BSW, RN, OCN  Oncology Nurse Navigator

## 2020-03-19 ENCOUNTER — HOSPITAL ENCOUNTER (OUTPATIENT)
Dept: RADIATION ONCOLOGY | Age: 65
Discharge: HOME | End: 2020-03-19
Attending: RADIOLOGY
Payer: COMMERCIAL

## 2020-03-19 VITALS
OXYGEN SATURATION: 97 % | SYSTOLIC BLOOD PRESSURE: 153 MMHG | TEMPERATURE: 97.9 F | RESPIRATION RATE: 16 BRPM | WEIGHT: 184.4 LBS | HEART RATE: 69 BPM | DIASTOLIC BLOOD PRESSURE: 78 MMHG

## 2020-03-19 PROCEDURE — 77385 HC NTSTY MODUL RAD TX DLVR SMPL: CPT | Performed by: RADIOLOGY

## 2020-03-20 ENCOUNTER — HOSPITAL ENCOUNTER (OUTPATIENT)
Dept: RADIATION ONCOLOGY | Age: 65
Discharge: HOME | End: 2020-03-20
Attending: RADIOLOGY
Payer: COMMERCIAL

## 2020-03-20 PROCEDURE — 77385 HC NTSTY MODUL RAD TX DLVR SMPL: CPT | Performed by: RADIOLOGY

## 2020-03-20 PROCEDURE — 77336 RADIATION PHYSICS CONSULT: CPT | Performed by: RADIOLOGY

## 2020-03-23 ENCOUNTER — HOSPITAL ENCOUNTER (OUTPATIENT)
Dept: RADIATION ONCOLOGY | Age: 65
Discharge: HOME | End: 2020-03-23
Attending: RADIOLOGY
Payer: COMMERCIAL

## 2020-03-23 PROCEDURE — 77385 HC NTSTY MODUL RAD TX DLVR SMPL: CPT | Performed by: RADIOLOGY

## 2020-03-24 ENCOUNTER — HOSPITAL ENCOUNTER (OUTPATIENT)
Dept: RADIATION ONCOLOGY | Age: 65
Discharge: HOME | End: 2020-03-24
Attending: RADIOLOGY
Payer: COMMERCIAL

## 2020-03-24 PROCEDURE — 77385 HC NTSTY MODUL RAD TX DLVR SMPL: CPT | Performed by: RADIOLOGY

## 2020-03-25 ENCOUNTER — HOSPITAL ENCOUNTER (OUTPATIENT)
Dept: RADIATION ONCOLOGY | Age: 65
Discharge: HOME | End: 2020-03-25
Attending: RADIOLOGY
Payer: COMMERCIAL

## 2020-03-25 PROCEDURE — 77385 HC NTSTY MODUL RAD TX DLVR SMPL: CPT | Performed by: RADIOLOGY

## 2020-03-26 ENCOUNTER — HOSPITAL ENCOUNTER (OUTPATIENT)
Dept: RADIATION ONCOLOGY | Age: 65
Discharge: HOME | End: 2020-03-26
Attending: RADIOLOGY
Payer: COMMERCIAL

## 2020-03-26 VITALS
DIASTOLIC BLOOD PRESSURE: 80 MMHG | RESPIRATION RATE: 16 BRPM | OXYGEN SATURATION: 96 % | WEIGHT: 185.8 LBS | SYSTOLIC BLOOD PRESSURE: 133 MMHG | TEMPERATURE: 98.1 F | HEART RATE: 70 BPM

## 2020-03-26 PROCEDURE — 77385 HC NTSTY MODUL RAD TX DLVR SMPL: CPT | Performed by: RADIOLOGY

## 2020-03-27 ENCOUNTER — HOSPITAL ENCOUNTER (OUTPATIENT)
Dept: RADIATION ONCOLOGY | Age: 65
Discharge: HOME | End: 2020-03-27
Attending: RADIOLOGY
Payer: COMMERCIAL

## 2020-03-27 PROCEDURE — 77336 RADIATION PHYSICS CONSULT: CPT | Performed by: RADIOLOGY

## 2020-03-27 PROCEDURE — 77385 HC NTSTY MODUL RAD TX DLVR SMPL: CPT | Performed by: RADIOLOGY

## 2020-03-30 ENCOUNTER — HOSPITAL ENCOUNTER (OUTPATIENT)
Dept: RADIATION ONCOLOGY | Age: 65
End: 2020-03-30
Attending: RADIOLOGY
Payer: COMMERCIAL

## 2020-03-30 PROCEDURE — 77385 HC NTSTY MODUL RAD TX DLVR SMPL: CPT | Performed by: RADIOLOGY

## 2020-03-31 ENCOUNTER — HOSPITAL ENCOUNTER (OUTPATIENT)
Dept: RADIATION ONCOLOGY | Age: 65
Discharge: HOME | End: 2020-03-31
Attending: RADIOLOGY
Payer: COMMERCIAL

## 2020-03-31 VITALS
WEIGHT: 181.38 LBS | OXYGEN SATURATION: 98 % | RESPIRATION RATE: 18 BRPM | SYSTOLIC BLOOD PRESSURE: 118 MMHG | DIASTOLIC BLOOD PRESSURE: 62 MMHG | TEMPERATURE: 98.4 F | HEART RATE: 68 BPM

## 2020-03-31 PROCEDURE — 77385 HC NTSTY MODUL RAD TX DLVR SMPL: CPT | Performed by: RADIOLOGY

## 2020-03-31 NOTE — PROGRESS NOTES
Maddy Sheppardotzlkita  3/31/2020  12:03 PM        Wt Readings from Last 3 Encounters:   03/31/20 181 lb 6 oz (82.3 kg)   03/26/20 185 lb 12.8 oz (84.3 kg)   03/19/20 184 lb 6.4 oz (83.6 kg)       Subjective:  At 48.6 Gy to the prostate bed and seminal vesicle remanence doing moderately well minimal increased irritation in the perianal area and also in the suprapubic region patient is using Aquaphor 3 times daily, no stress incontinence is experienced, minimal generalized fatigue, otherwise doing well for some increased frequency of bowel movements and looseness of bowel movements      Objective: Skin: The minimal increase grade 1 to grade 2 dermatitis in the intergluteal fold and to some extent in the suprapubic region  Abdomen: Nontender      Assessment: Tolerating radiation treatment moderately well, in the adjuvant setting post prostatectomy      Plan: Continue current management      Electronically signed by Asad Aranda MD on 3/31/20 at 12:03 PM EDT

## 2020-04-01 ENCOUNTER — HOSPITAL ENCOUNTER (OUTPATIENT)
Dept: RADIATION ONCOLOGY | Age: 65
End: 2020-04-01
Attending: RADIOLOGY
Payer: COMMERCIAL

## 2020-04-01 PROCEDURE — 77385 HC NTSTY MODUL RAD TX DLVR SMPL: CPT | Performed by: RADIOLOGY

## 2020-04-02 ENCOUNTER — HOSPITAL ENCOUNTER (OUTPATIENT)
Dept: RADIATION ONCOLOGY | Age: 65
Discharge: HOME | End: 2020-04-02
Attending: RADIOLOGY
Payer: COMMERCIAL

## 2020-04-02 PROCEDURE — 77385 HC NTSTY MODUL RAD TX DLVR SMPL: CPT | Performed by: RADIOLOGY

## 2020-04-03 ENCOUNTER — HOSPITAL ENCOUNTER (OUTPATIENT)
Dept: RADIATION ONCOLOGY | Age: 65
Discharge: HOME | End: 2020-04-03
Attending: RADIOLOGY
Payer: COMMERCIAL

## 2020-04-03 PROCEDURE — 77336 RADIATION PHYSICS CONSULT: CPT | Performed by: RADIOLOGY

## 2020-04-03 PROCEDURE — 77385 HC NTSTY MODUL RAD TX DLVR SMPL: CPT | Performed by: RADIOLOGY

## 2020-04-06 ENCOUNTER — HOSPITAL ENCOUNTER (OUTPATIENT)
Dept: RADIATION ONCOLOGY | Age: 65
Discharge: HOME | End: 2020-04-06
Attending: RADIOLOGY
Payer: COMMERCIAL

## 2020-04-06 PROCEDURE — 77385 HC NTSTY MODUL RAD TX DLVR SMPL: CPT | Performed by: RADIOLOGY

## 2020-04-07 ENCOUNTER — HOSPITAL ENCOUNTER (OUTPATIENT)
Dept: RADIATION ONCOLOGY | Age: 65
Discharge: HOME | End: 2020-04-07
Attending: RADIOLOGY
Payer: COMMERCIAL

## 2020-04-07 PROCEDURE — 77385 HC NTSTY MODUL RAD TX DLVR SMPL: CPT | Performed by: RADIOLOGY

## 2020-04-08 ENCOUNTER — HOSPITAL ENCOUNTER (OUTPATIENT)
Dept: RADIATION ONCOLOGY | Age: 65
Discharge: HOME | End: 2020-04-08
Attending: RADIOLOGY
Payer: COMMERCIAL

## 2020-04-08 PROCEDURE — 77385 HC NTSTY MODUL RAD TX DLVR SMPL: CPT | Performed by: RADIOLOGY

## 2020-04-09 ENCOUNTER — HOSPITAL ENCOUNTER (OUTPATIENT)
Dept: RADIATION ONCOLOGY | Age: 65
Discharge: HOME | End: 2020-04-09
Attending: RADIOLOGY
Payer: COMMERCIAL

## 2020-04-09 ENCOUNTER — APPOINTMENT (OUTPATIENT)
Dept: RADIATION ONCOLOGY | Age: 65
End: 2020-04-09
Attending: RADIOLOGY
Payer: COMMERCIAL

## 2020-04-09 VITALS
RESPIRATION RATE: 16 BRPM | HEART RATE: 68 BPM | SYSTOLIC BLOOD PRESSURE: 122 MMHG | WEIGHT: 182.6 LBS | DIASTOLIC BLOOD PRESSURE: 76 MMHG | TEMPERATURE: 98.3 F

## 2020-04-09 PROCEDURE — 77385 HC NTSTY MODUL RAD TX DLVR SMPL: CPT | Performed by: RADIOLOGY

## 2020-04-09 NOTE — PROGRESS NOTES
Tylerjael Billy Klotzle  4/9/2020  11:36 AM        Wt Readings from Last 3 Encounters:   04/09/20 182 lb 9.6 oz (82.8 kg)   03/31/20 181 lb 6 oz (82.3 kg)   03/26/20 185 lb 12.8 oz (84.3 kg)       Subjective: At 61.2 Portillo to the prostate bed and seminal vesicle remanence, continue to use Aquaphor, increasing dermatitis both in the suprapubic area of the bilateral groins and also the gluteal region minimal bright red blood per rectum after bowel movement, mild fatigue but continue to work full-time      Objective:   Radiation portal 11 possible early bilateral dermatophyte infection in the medial aspect of the bilateral inguinal fold, increased dermatitis in the suprapubic region, increased gluteal area erythema without any dry desquamation somewhat papular looking to touch, enlargement of external hemorrhoids in the perianal area with dusky erythema      Assessment: Tolerating radiation and chemotherapy moderately well, but with expected increased dermal toxicity      Plan: Continue current management of advised the patient to start using over-the-counter Preparation H twice daily in tandem with Aquaphor 3 times daily to the perianal area, if the suspected areas of dermatophyte infection in the bilateral medial inguinal area and also to a lesser extent in the gluteal region becomes more florid so that we can confirm dermatophyte infection, patient may commence using Lotrimin twice daily and affected areas. However, I am not 792% certain if patient indeed is experiencing any dermatophyte infection at this point in time and if he today is dermatophyte infection then it must be very subtle.       Electronically signed by Sofi Borrego MD on 4/9/20 at 11:36 AM EDT

## 2020-04-10 ENCOUNTER — HOSPITAL ENCOUNTER (OUTPATIENT)
Dept: RADIATION ONCOLOGY | Age: 65
Discharge: HOME | End: 2020-04-10
Attending: RADIOLOGY
Payer: COMMERCIAL

## 2020-04-10 PROCEDURE — 77385 HC NTSTY MODUL RAD TX DLVR SMPL: CPT | Performed by: RADIOLOGY

## 2020-04-10 PROCEDURE — 77336 RADIATION PHYSICS CONSULT: CPT | Performed by: RADIOLOGY

## 2020-04-13 ENCOUNTER — HOSPITAL ENCOUNTER (OUTPATIENT)
Dept: RADIATION ONCOLOGY | Age: 65
Discharge: HOME | End: 2020-04-13
Attending: RADIOLOGY
Payer: COMMERCIAL

## 2020-04-13 PROCEDURE — 77385 HC NTSTY MODUL RAD TX DLVR SMPL: CPT | Performed by: RADIOLOGY

## 2020-04-14 ENCOUNTER — APPOINTMENT (OUTPATIENT)
Dept: RADIATION ONCOLOGY | Age: 65
End: 2020-04-14
Attending: RADIOLOGY
Payer: COMMERCIAL

## 2020-04-14 PROCEDURE — 77385 HC NTSTY MODUL RAD TX DLVR SMPL: CPT | Performed by: RADIOLOGY

## 2020-04-15 ENCOUNTER — HOSPITAL ENCOUNTER (OUTPATIENT)
Dept: RADIATION ONCOLOGY | Age: 65
Discharge: HOME | End: 2020-04-15
Attending: RADIOLOGY
Payer: COMMERCIAL

## 2020-04-15 PROCEDURE — 77385 HC NTSTY MODUL RAD TX DLVR SMPL: CPT | Performed by: RADIOLOGY

## 2020-04-16 ENCOUNTER — HOSPITAL ENCOUNTER (OUTPATIENT)
Dept: RADIATION ONCOLOGY | Age: 65
Discharge: HOME | End: 2020-04-16
Attending: RADIOLOGY
Payer: COMMERCIAL

## 2020-04-16 VITALS
OXYGEN SATURATION: 98 % | TEMPERATURE: 98.3 F | RESPIRATION RATE: 16 BRPM | DIASTOLIC BLOOD PRESSURE: 80 MMHG | WEIGHT: 184.2 LBS | SYSTOLIC BLOOD PRESSURE: 150 MMHG

## 2020-04-16 PROCEDURE — 77336 RADIATION PHYSICS CONSULT: CPT | Performed by: RADIOLOGY

## 2020-04-16 PROCEDURE — 77385 HC NTSTY MODUL RAD TX DLVR SMPL: CPT | Performed by: RADIOLOGY

## 2020-04-16 NOTE — PROGRESS NOTES
[facility]  4/16/2020  9:51 AM          Current Outpatient Medications   Medication Sig Dispense Refill    levothyroxine (SYNTHROID) 50 MCG tablet Take 50 mcg by mouth daily   1    simvastatin (ZOCOR) 20 MG tablet Take 20 mg by mouth daily   0    vitamin D 1000 units CAPS Take 1,000 Units by mouth daily        No current facility-administered medications for this encounter. This is an up-to-date medication list.    Please take this list to your next care provider, and discard any previous medication lists.

## 2020-04-16 NOTE — PROGRESS NOTES
Osvaldo Sheppardotzle  4/16/2020  Wt Readings from Last 3 Encounters:   04/16/20 184 lb 3.2 oz (83.6 kg)   04/09/20 182 lb 9.6 oz (82.8 kg)   03/31/20 181 lb 6 oz (82.3 kg)     There is no height or weight on file to calculate BMI. Treatment Area: prostate/ pelvis    Patient was seen today for weekly visit. Comfort Alteration  KPS:100%  Fatigue: Mild    Nutritional Alteration  Anorexia: No  Nausea: No  Vomiting:No     Elimination Alterations  Constipation: no  Diarrhea:  Yes ocassional  Urinary Frequency/Urgency: No  Urinary Retention: No  Dysuria: No  Urinary Incontinence: Yes dribbles at times, mostly when drinks beer. Proctitis: No  Nocturia: Yes #/night: 1     Skin Alteration   Sensation: Tenderness around rectum. Pt has cut back on moisturizing skin. Pt educated on importance of continuing to moisturize. Radiation Dermatitis:  Rash to lower abd. Pt feels may have spread to arms. Emotional  Coping: effective    Sexuality Alteration  NA     Injury, potential bleeding or infection: NA     Lab Results   Component Value Date    WBC 8.5 03/27/2019     03/27/2019         BP (!) 150/80   Temp 98.3 °F (36.8 °C)   Resp 16   Wt 184 lb 3.2 oz (83.6 kg)   SpO2 98%   BP within normal range? yes         Assessment/Plan:completed 39/39 fractions; 7020 cGy. Pt has overall tolerated well. Dr. Marlene uDgan updated.      Jl Moseley

## 2020-04-17 ENCOUNTER — APPOINTMENT (OUTPATIENT)
Dept: RADIATION ONCOLOGY | Age: 65
End: 2020-04-17
Attending: RADIOLOGY
Payer: COMMERCIAL

## 2020-05-26 ENCOUNTER — VIRTUAL VISIT (OUTPATIENT)
Dept: VASCULAR SURGERY | Age: 65
End: 2020-05-26
Payer: COMMERCIAL

## 2020-05-26 VITALS — WEIGHT: 184 LBS | BODY MASS INDEX: 27.89 KG/M2 | HEIGHT: 68 IN

## 2020-05-26 PROCEDURE — 99441 PR PHYS/QHP TELEPHONE EVALUATION 5-10 MIN: CPT | Performed by: PHYSICIAN ASSISTANT

## 2020-05-28 ENCOUNTER — TELEPHONE (OUTPATIENT)
Dept: VASCULAR SURGERY | Age: 65
End: 2020-05-28

## 2020-06-09 ENCOUNTER — TELEPHONE (OUTPATIENT)
Dept: VASCULAR SURGERY | Age: 65
End: 2020-06-09

## 2020-06-10 ENCOUNTER — APPOINTMENT (OUTPATIENT)
Dept: RADIATION ONCOLOGY | Age: 65
End: 2020-06-10
Attending: RADIOLOGY
Payer: COMMERCIAL

## 2020-07-30 ENCOUNTER — TELEPHONE (OUTPATIENT)
Dept: CASE MANAGEMENT | Age: 65
End: 2020-07-30

## 2020-07-30 PROBLEM — C61 MALIGNANT NEOPLASM OF PROSTATE (HCC): Status: ACTIVE | Noted: 2019-07-08

## 2020-07-31 ENCOUNTER — TELEPHONE (OUTPATIENT)
Dept: VASCULAR SURGERY | Age: 65
End: 2020-07-31

## 2020-08-03 ENCOUNTER — HOSPITAL ENCOUNTER (OUTPATIENT)
Dept: RADIATION ONCOLOGY | Age: 65
Discharge: HOME | End: 2020-08-03
Attending: RADIOLOGY
Payer: COMMERCIAL

## 2020-08-03 ENCOUNTER — TELEPHONE (OUTPATIENT)
Dept: CASE MANAGEMENT | Age: 65
End: 2020-08-03

## 2020-08-03 VITALS
DIASTOLIC BLOOD PRESSURE: 70 MMHG | TEMPERATURE: 97.3 F | HEART RATE: 76 BPM | SYSTOLIC BLOOD PRESSURE: 136 MMHG | BODY MASS INDEX: 27.95 KG/M2 | RESPIRATION RATE: 16 BRPM | WEIGHT: 183.8 LBS

## 2020-08-03 PROCEDURE — 99213 OFFICE O/P EST LOW 20 MIN: CPT | Performed by: NURSE PRACTITIONER

## 2020-08-03 PROCEDURE — 99212 OFFICE O/P EST SF 10 MIN: CPT

## 2020-08-03 NOTE — PROGRESS NOTES
Radiation Oncology   Follow Up Note     8/3/2020    Susu Reyes  Vitals:    08/03/20 0939   BP: 136/70   Pulse: 76   Resp: 16   Temp: 97.3 °F (36.3 °C)     Wt Readings from Last 3 Encounters:   08/03/20 183 lb 12.8 oz (83.4 kg)   05/26/20 184 lb (83.5 kg)   04/16/20 184 lb 3.2 oz (83.6 kg)         Ramón Conroy DO,      CC: Patient is here for follow up for post-radiation completion. HPI:  Susu Reyes is a pleasant 75 y.o. male with a diagnosis of high risk adenocarcinoma of the prostate, s/p RP and adjuvant XRT. The patient underwent a course of radiation therapy to the prostate bed + SV, which was completed on 4/16/20. He completed 7020 cGy in 39 fractions. States that he is doing well. Denies any skin issues. Denies any urinary issues and note nocturia 1 time nightly. Denies constipation or diarrhea. Pt is following with Dr Darlene Kimble for urology. Last PSA completed 7/15/20, WNL (<0.03). Next appt in 6 mos with PSA prior.     Past Medical History:   Diagnosis Date    Full dentures     Hyperlipidemia     Prostate CA ([address]     Vitamin D deficiency          Past Surgical History:   Procedure Laterality Date    COLONOSCOPY      PROSTATE SURGERY      TONSILLECTOMY      TOOTH EXTRACTION      WISDOM TOOTH EXTRACTION           Social History     Socioeconomic History    Marital status:      Spouse name: Not on file    Number of children: Not on file    Years of education: Not on file    Highest education level: Not on file   Occupational History    Not on file   Social Needs    Financial resource strain: Not on file    Food insecurity     Worry: Not on file     Inability: Not on file    Transportation needs     Medical: Not on file     Non-medical: Not on file   Tobacco Use    Smoking status: Current Every Day Smoker     Packs/day: 0.50     Types: Cigarettes    Smokeless tobacco: Never Used    Tobacco comment: 1-2 cigs in the am-the rest in the evening with beer Substance and Sexual Activity    Alcohol use: Yes     Comment: 2-3 beers per day    Drug use: Never    Sexual activity: Not on file   Lifestyle    Physical activity     Days per week: Not on file     Minutes per session: Not on file    Stress: Not on file   Relationships    Social connections     Talks on phone: Not on file     Gets together: Not on file     Attends [employer] service: Not on file     Active member of club or organization: Not on file     Attends meetings of clubs or organizations: Not on file     Relationship status: Not on file    Intimate partner violence     Fear of current or ex partner: Not on file     Emotionally abused: Not on file     Physically abused: Not on file     Forced sexual activity: Not on file   Other Topics Concern    Not on file   Social History Narrative    Not on file         Family History   Problem Relation Age of Onset    Heart Disease Mother     Other Father         MS    Cancer Sister 72       Allergies:   Patient has no known allergies. Current Outpatient Medications   Medication Sig Dispense Refill    levothyroxine (SYNTHROID) 50 MCG tablet Take 50 mcg by mouth daily   1    simvastatin (ZOCOR) 20 MG tablet Take 20 mg by mouth daily   0    vitamin D 1000 units CAPS Take 1,000 Units by mouth daily        No current facility-administered medications for this encounter. REVIEW OF SYSTEMS:       Constitutional:  No fever chills or rigors.  Eyes: No changes in vision, discharge, or pain   ENT: No Headaches, hearing loss or vertigo. No mouth sores or sore throat. No change in taste or smell.  Cardiovascular: No chest discomfort, dyspnea on exertion, palpitations or loss of consciousness or phlebitis.  Respiratory: Has no cough or wheezing, Has no sputum production. Has no hemoptysis, has no pleuritic pain.  Gastrointestinal: No abdominal pain, appetite loss, blood in stools. No change in bowel habits.  No hematemesis    Genitourinary: Patient acknowledges no dysuria, trouble voiding, or hematuria. No increased frequency. +nocturia 1x nightly   Musculoskeletal: No gait disturbance, weakness or joint complaints.  Integumentary: No rash or pruritis.  Neurological: No headache, diplopia, change in muscle strength, numbness or tingling. No change in gait, balance, coordination, mood, affect, memory, mentation, behavior.  Psychiatric: No anxiety, or depression.  Endocrine: No temperature intolerance. No excessive thirst, fluid intake, or urination. No tremor.  Hematologic/Lymphatic: No abnormal bruising or bleeding, blood clots or swollen lymph nodes.  Allergic/Immunologic: No nasal congestion or hives. PHYSICAL EXAMINATION:   Vitals:    08/03/20 0939   BP: 136/70   Pulse: 76   Resp: 16   Temp: 97.3 °F (36.3 °C)   TempSrc: Temporal   Weight: 183 lb 12.8 oz (83.4 kg)     Constitutional: A well developed, well nourished 75 y.o. male who is alert, oriented, cooperative and in no apparent distress. Head was normocephalic and atraumatic. EENT: EOMI ALMA. MMM. No icterus. Neck: Trachea was midline. Respiratory: Chest expansion was symmetrical. Breath sounds bilaterally were clear to auscultation. No wheezes, rhonchi or rales. No intercostal retraction or use of accessory muscles. Cardiovascular: Regular without murmur, clicks, gallops or rubs. Abdomen: Obese, rounded and soft without organomegaly. No rebound, guarding or  rigidity. Lymphatic: No lymphadenopathy. Musculoskeletal: Ambulates without assistance. Normal curvature of the spine. No gross muscle weakness. Muscle size, tone and strength are normal. No involuntary movements. Extremities:  No lower extremity edema, ulcerations, tenderness, varicosities or erythema. Coordination appears adequate. Sensory function appears intact. Skin:  Warm and dry. Examination of color, turgor and pigmentation was relatively normal. No bruises or skin rashes.  Old surgical scars are noted. Neurological/Psychiatric: General behavior, level of consciousness, thought content is normal. The patient's emotional status is normal.  Cranial nerves II-XII are grossly intact. IMAGING:    No new imaging. ASSESSMENT/PLAN:    High risk adenocarcinoma of the prostate, s/p RP and adjuvant radiation therapy to the prostate bed + SV completed on 4/16/20 (7020 cGy in 39 fractions). Patient is doing well post-radiation completion. Skin care reviewed. Reviewed signs and symptoms of recurrence. Imaging per urology. PSA per NCCN guidelines. Pt is following with  [facility] for urology. Last PSA completed 7/15/20, WNL (<0.03). Next appt in 6 mos with PSA prior. I discussed follow up plans with Bethanie Gaytan. At this time, Dr Sheridan Corbin will see the patient back on a PRN basis as he is following closely with urology for his prostate cancer. Instructed to follow up with other providers involved in their care as directed (including but not limited to Medical Oncology, Primary Care, Pulmonary, and Surgery). The patient was given our contact number in the event that if at any time they change their mind and would like to return to the clinic to see either myself or one of the Radiation Oncologists, they can simply call us and we would be happy to see them. Thank you for involving us in the management of this extremely pleasant patient. More than 25 min was in direct contact with pt coordinating/giving care. >50% of the visit was spent in counseling the pt on the following: Follow up care    The nurses notes were reviewed and incorporated into this assessment and plan.           Sincerely,    Mary Mcgee, MSN, RN, APRN-CNP  Nurse Practitioner for Radiation Oncology    [facility] [address]) [facility]: [phone] (QBA: [phone])  [facility]) [facility]:  [phone] (J[facility]:  [phone])  [facility]: [phone] (FAX: [phone])

## 2020-08-03 NOTE — TELEPHONE ENCOUNTER
Met with patient during his follow up appointment with Sanjana Jean CNP for radiation therapy today. Patient completed his active treatment April 2020 for his Stage IIIC (pT3b,pN0) GS 9 (4+5) prostate cancer. Patient appears in good spirits and doing well. States that he is doing good and continues to work. Denies any side effect issues. Reports good appetite, sleeping well, and energy improving. Patient states that he saw Dr. Alok Vásquez on 7/29/2020 and will see again in 6 months. Recent PSA 0.03. Provided support and encouragement. Instructed patient in detail on his Cancer Treatment Summary and Survivorship Care Plan. Copies faxed to patient's PCP. Also provided with [facility]: Life After Treatment The Next Chapter in Your Survivorship Journey booklet. .  Instructed to call with any questions or concerns. Follow up appointment with radiation therapy department to be scheduled Feb 2020. Verbally agreed. Patient appreciative of visit and information. FRANCISCO Ryan, RN, OCN  Oncology Nurse Navigator

## 2020-08-03 NOTE — PROGRESS NOTES
Jess Olivas  8/3/2020  9:35 AM      There were no vitals filed for this visit. :    Wt Readings from Last 3 Encounters:   05/26/20 184 lb (83.5 kg)   04/16/20 184 lb 3.2 oz (83.6 kg)   04/09/20 182 lb 9.6 oz (82.8 kg)                Current Outpatient Medications:     levothyroxine (SYNTHROID) 50 MCG tablet, Take 50 mcg by mouth daily , Disp: , Rfl: 1    simvastatin (ZOCOR) 20 MG tablet, Take 20 mg by mouth daily , Disp: , Rfl: 0    vitamin D 1000 units CAPS, Take 1,000 Units by mouth daily , Disp: , Rfl:       Patient is seen today in follow up for prostate cancer. Pt completed RT on 4/16/20. Pt is doing well. He has returned to work & denies any issues. Denies nocturia, urinary frequency & dysuria. Follow up with Dr. Alok Vásquez on 7/29/20; PSA was <0.03. FALLS RISK SCREENING ASSESSMENT    Instructions:  Assess the patient and enter the appropriate indicators that are present for fall risk identification. Total the numbers entered and assign a fall risk score from Table 2.  Reassess patient at a minimum every 12 weeks or with status change. Assessment   Date  8/3/2020     1. Mental Ability: confusion/cognitively impaired No - 0       2. Elimination Issues: incontinence, frequency No - 0       3. Ambulatory: use of assistive devices (walker, cane, off-loading devices), attached to equipment (IV pole, oxygen) No - 0     4. Sensory Limitations: dizziness, vertigo, impaired vision No - 0       5. Age 72 years or greater - 1       10. Medication: diuretics, strong analgesics, hypnotics, sedatives, antihypertensive agents   No - 0   7. Falls:  recent history of falls within the last 3 months (not to include slipping or tripping)   No - 0   TOTAL 1    If score of 4 or greater was education given? No       TABLE 2   Risk Score Risk Level Plan of Care   0-3 Little or  No Risk 1. Provide assistance as indicated for ambulation activities  2. Reorient confused/cognitively impaired patient  3. Call-light/bell within patient's reach  4. Chair/bed in low position, stretcher/bed with siderails up except when performing patient care activities  5. Educate patient/family/caregiver on falls prevention  6.  Reassess in 12 weeks or with any noted change in patient condition which places them at a risk for a fall   4-6 Moderate Risk 1. Provide assistance as indicated for ambulation activities  2. Reorient confused/cognitively impaired patient  3. Call-light/bell within patient's reach  4. Chair/bed in low position, stretcher/bed with siderails up except when performing patient care activities  5. Educate patient/family/caregiver on falls prevention  6. Falls risk precaution (Yellow sticker Level II) placed on patient chart   7 or   Higher High Risk 1. Place patient in easily observable treatment room  2. Patient attended at all times by family member or staff  3. Provide assistance as indicated for ambulation activities  4. Reorient confused/cognitively impaired patient  5. Call-light/bell within patient's reach  6. Chair/bed in low position, stretcher/bed with siderails up except when performing patient care activities  7. Educate patient/family/caregiver on falls prevention  8. Falls risk precaution (Yellow sticker Level III) placed on patient chart           MALNUTRITION RISK SCREENING ASSESSMENT    8/3/2020   Patient:  Sheryl Point  Sex:  male    Instructions:  Assess the patient and enter the appropriate indicators that are present for nutrition risk identification. Total the numbers entered and assign a risk score. Follow the appropriate action for total score listed below. Assessment   Date  8/3/2020     1. Have you lost weight without trying? 0- No     2. Have you been eating poorly because of a decreased appetite? 0- No   3. Do you have a diagnosis of head and neck cancer?       0- No TOTAL 0          Score of 0-1: No action  Score 2 or greater:  · For Non-Diabetic Patient: Recommend adding Ensure Complete 2 x daily and provide patient with Ensure wellness bag with coupons  · For Diabetic Patient: Recommend adding Glucerna Shake 2 x daily and provide patient with Glucerna Wellness bag with coupons  · Route to the dietitian via [employer]

## 2020-08-04 NOTE — ADDENDUM NOTE
Encounter addended by: ROGE Lopez on: 8/4/2020 11:04 AM   Actions taken: Charge Capture section accepted

## 2021-02-01 ENCOUNTER — TELEPHONE (OUTPATIENT)
Dept: RADIATION ONCOLOGY | Age: 66
End: 2021-02-01

## 2021-02-01 NOTE — TELEPHONE ENCOUNTER
Called pt to reschedule 6 mo follow up appt pt stated he does not want to reschedule appt as he is following with his Urologist Dr. Thalia Wills.

## 2021-06-07 ENCOUNTER — TELEPHONE (OUTPATIENT)
Dept: VASCULAR SURGERY | Age: 66
End: 2021-06-07

## 2021-06-08 ENCOUNTER — HOSPITAL ENCOUNTER (OUTPATIENT)
Dept: CARDIOLOGY | Age: 66
Discharge: HOME | End: 2021-06-08
Payer: COMMERCIAL

## 2021-06-08 ENCOUNTER — OFFICE VISIT (OUTPATIENT)
Dept: VASCULAR SURGERY | Age: 66
End: 2021-06-08
Payer: COMMERCIAL

## 2021-06-08 DIAGNOSIS — I71.40 ABDOMINAL AORTIC ANEURYSM WITHOUT RUPTURE: Primary | ICD-10-CM

## 2021-06-08 DIAGNOSIS — I71.40 AAA (ABDOMINAL AORTIC ANEURYSM) WITHOUT RUPTURE: ICD-10-CM

## 2021-06-08 PROCEDURE — 99213 OFFICE O/P EST LOW 20 MIN: CPT | Performed by: SURGERY

## 2021-06-08 PROCEDURE — 93978 VASCULAR STUDY: CPT

## 2021-06-08 NOTE — PROGRESS NOTES
Vascular Surgery Outpatient Progress Note      Chief Complaint   Patient presents with    Circulatory Problem     Follow up AAA       HISTORY OF PRESENT ILLNESS:      Pt seen and plan reviewed with Dr. Tarsha Gonzalez. The patient is a 77 y.o. male who returns for follow-up evaluation of abdominal aortic aneurysm. He denies any recent hospital admissions, major illness or surgery since his last evaluation. He did have a TURP July 2019 and completed 39 sessions of radiation with last treatment in April of 2020. Past Medical History:        Diagnosis Date    Full dentures     Hyperlipidemia     Prostate CA ([address]     Vitamin D deficiency      Past Surgical History:        Procedure Laterality Date    COLONOSCOPY      PROSTATE SURGERY      TONSILLECTOMY      TOOTH EXTRACTION      WISDOM TOOTH EXTRACTION       Current Medications:   Prior to Admission medications    Medication Sig Start Date End Date Taking? Authorizing Provider   levothyroxine (SYNTHROID) 50 MCG tablet Take 50 mcg by mouth daily  3/13/18  Yes Historical Provider, MD   simvastatin (ZOCOR) 20 MG tablet Take 20 mg by mouth daily  4/7/16  Yes Historical Provider, MD   vitamin D 1000 units CAPS Take 1,000 Units by mouth daily    Yes Historical Provider, MD     Allergies:  Patient has no known allergies.     Social History     Socioeconomic History    Marital status:      Spouse name: Not on file    Number of children: Not on file    Years of education: Not on file    Highest education level: Not on file   Occupational History    Not on file   Tobacco Use    Smoking status: Current Every Day Smoker     Packs/day: 0.50     Types: Cigarettes    Smokeless tobacco: Never Used    Tobacco comment: 1-2 cigs in the am-the rest in the evening with beer   Vaping Use    Vaping Use: Never used   Substance and Sexual Activity    Alcohol use: Yes     Comment: 2-3 beers per day    Drug use: Never    Sexual activity: Not on file   Other Topics Concern    Not on file   Social History Narrative    Not on file     Social Determinants of Health     Financial Resource Strain:     Difficulty of Paying Living Expenses:    Food Insecurity:     Worried About Running Out of Food in the Last Year:     [address] in the Last Year:    Transportation Needs:     Lack of Transportation (Medical):      Lack of Transportation (Non-Medical):    Physical Activity:     Days of Exercise per Week:     Minutes of Exercise per Session:    Stress:     Feeling of Stress :    Social Connections:     Frequency of Communication with Friends and Family:     Frequency of Social Gatherings with Friends and Family:     Attends Buddhist Services:     Active Member of Clubs or Organizations:     Attends Club or Organization Meetings:     Marital Status:    Intimate Partner Violence:     Fear of Current or Ex-Partner:     Emotionally Abused:     Physically Abused:     Sexually Abused:         Family History   Problem Relation Age of Onset    Heart Disease Mother     Other Father         MS    Cancer Sister 72       REVIEW OF SYSTEMS (New symptoms):    Eyes:      Blurred vision:  No [x]/Yes []               Diplopia:   No [x]/Yes []               Vision loss:       No [x]/Yes []   Ears, nose, throat:             Hearing loss:    No [x]/Yes []      Vertigo:   No [x]/Yes []                       Swallowing problem:  No [x]/Yes []               Nose bleeds:   No [x]/Yes []      Voice hoarseness:  No [x]/Yes []  Respiratory:             Cough:   No [x]/Yes []      Pleuritic chest pain:  No [x]/Yes []                        Dyspnea:   No [x]/Yes []      Wheezing:   No [x]/Yes []  Cardiovascular:             Angina:   No [x]/Yes []      Palpitations:   No [x]/Yes []          Claudication:    No [x]/Yes []      Leg swelling:   No [x]/Yes []  Gastrointestinal:             Nausea or vomiting:  No [x]/Yes []               Abdominal pain:  No [x]/Yes []

## 2021-06-08 NOTE — PROGRESS NOTES
[facility] Heart & Vascular Lab - [facility]    This is a pre read worksheet - prior to official physician interpretation    Tuan Pizarro  1955  Date of study: 6/8/21    Indication for study:  AAA  Study :  Aortic Ultrasound    Diameter Aorta:     Proximal:   cm     Mid:   cm     Distal:  4.4 x 4.5 cm     Right iliac: 1.4 x 1.4 cm     Left iliac: 1.3 x 1.4 cm    Additional comments:       LAST STUDY  6/2/2020  4.4 cm  Rt 1.4  Lt 1.2

## 2022-05-24 DIAGNOSIS — I71.40 ABDOMINAL AORTIC ANEURYSM WITHOUT RUPTURE: Primary | ICD-10-CM

## 2022-06-13 ENCOUNTER — TELEPHONE (OUTPATIENT)
Dept: VASCULAR SURGERY | Age: 67
End: 2022-06-13

## 2022-06-13 NOTE — TELEPHONE ENCOUNTER
Called to confirm appointment for 6/14/22 at 830 a.m, left message with date, time, and phone number for patient.

## 2022-06-14 ENCOUNTER — OFFICE VISIT (OUTPATIENT)
Dept: VASCULAR SURGERY | Age: 67
End: 2022-06-14
Payer: COMMERCIAL

## 2022-06-14 ENCOUNTER — HOSPITAL ENCOUNTER (OUTPATIENT)
Dept: CARDIOLOGY | Age: 67
Discharge: HOME | End: 2022-06-14
Payer: COMMERCIAL

## 2022-06-14 VITALS — WEIGHT: 186 LBS | BODY MASS INDEX: 27.55 KG/M2 | HEIGHT: 69 IN

## 2022-06-14 DIAGNOSIS — I71.40 ABDOMINAL AORTIC ANEURYSM (AAA) WITHOUT RUPTURE: Primary | ICD-10-CM

## 2022-06-14 DIAGNOSIS — I71.40 ABDOMINAL AORTIC ANEURYSM WITHOUT RUPTURE: ICD-10-CM

## 2022-06-14 PROCEDURE — 4004F PT TOBACCO SCREEN RCVD TLK: CPT | Performed by: SURGERY

## 2022-06-14 PROCEDURE — G8427 DOCREV CUR MEDS BY ELIG CLIN: HCPCS | Performed by: SURGERY

## 2022-06-14 PROCEDURE — 1123F ACP DISCUSS/DSCN MKR DOCD: CPT | Performed by: NURSE PRACTITIONER

## 2022-06-14 PROCEDURE — 93978 VASCULAR STUDY: CPT

## 2022-06-14 PROCEDURE — G8417 CALC BMI ABV UP PARAM F/U: HCPCS | Performed by: SURGERY

## 2022-06-14 PROCEDURE — 99212 OFFICE O/P EST SF 10 MIN: CPT | Performed by: NURSE PRACTITIONER

## 2022-06-14 PROCEDURE — 3017F COLORECTAL CA SCREEN DOC REV: CPT | Performed by: SURGERY

## 2022-06-14 PROCEDURE — 76775 US EXAM ABDO BACK WALL LIM: CPT | Performed by: SURGERY

## 2022-06-14 NOTE — PROGRESS NOTES
[facility] & V[facility]    This is a pre read worksheet - prior to official physician interpretation    Génesis Mena  1955  Date of study: 6/14/22    Indication for study:  AAA  Study :  Aortic Ultrasound    Diameter Aorta:     Proximal:   cm     Mid:   cm     Distal:  4.5 x 4.5 cm     Right iliac: 1.4 x 1.4 cm     Left iliac: 1.4 x 1.4 cm    Additional comments:       LAST STUDY  6/8/2021  4.5 cm  Rt 1.4  Lt 1.4

## 2022-06-14 NOTE — PROGRESS NOTES
Vascular Surgery Outpatient Progress Note      Chief Complaint   Patient presents with    Follow-up     aaa       HISTORY OF PRESENT ILLNESS:              The patient is a 74 y.o. male who returns for follow-up evaluation of abdominal aortic aneurysm. He denies any abdominal or back pain. He denies any recent hospital admissions, major illness or surgery since his last evaluation. Past Medical History:        Diagnosis Date    Full dentures     Hyperlipidemia     Prostate CA ([address]     Vitamin D deficiency      Past Surgical History:        Procedure Laterality Date    COLONOSCOPY      PROSTATE SURGERY      TONSILLECTOMY      TOOTH EXTRACTION      WISDOM TOOTH EXTRACTION       Current Medications:   Prior to Admission medications    Medication Sig Start Date End Date Taking? Authorizing Provider   levothyroxine (SYNTHROID) 50 MCG tablet Take 75 mcg by mouth daily  3/13/18  Yes Historical Provider, MD   simvastatin (ZOCOR) 20 MG tablet Take 20 mg by mouth daily  4/7/16  Yes Historical Provider, MD   vitamin D 1000 units CAPS Take 1,000 Units by mouth daily    Yes Historical Provider, MD     Allergies:  Patient has no known allergies.     Social History     Socioeconomic History    Marital status:      Spouse name: Not on file    Number of children: Not on file    Years of education: Not on file    Highest education level: Not on file   Occupational History    Not on file   Tobacco Use    Smoking status: Current Every Day Smoker     Packs/day: 0.50     Types: Cigarettes    Smokeless tobacco: Never Used    Tobacco comment: 1-2 cigs in the am-the rest in the evening with beer   Vaping Use    Vaping Use: Never used   Substance and Sexual Activity    Alcohol use: Yes     Comment: 2-3 beers per day    Drug use: Never    Sexual activity: Not on file   Other Topics Concern    Not on file   Social History Narrative    Not on file     Social Determinants of Health     Financial Resource Strain:     Difficulty of Paying Living Expenses: Not on file   Food Insecurity:     Worried About Running Out of Food in the Last Year: Not on file    [place] of Food in the Last Year: Not on file   Transportation Needs:     Lack of Transportation (Medical): Not on file    Lack of Transportation (Non-Medical):  Not on file   Physical Activity:     Days of Exercise per Week: Not on file    Minutes of Exercise per Session: Not on file   Stress:     Feeling of Stress : Not on file   Social Connections:     Frequency of Communication with Friends and Family: Not on file    Frequency of Social Gatherings with Friends and Family: Not on file    Attends Christianity Services: Not on file    Active Member of [address] [place] [address] Strut or Organizations: Not on file    Attends Club or Organization Meetings: Not on file    Marital Status: Not on file   Intimate Partner Violence:     Fear of Current or Ex-Partner: Not on file    Emotionally Abused: Not on file    Physically Abused: Not on file    Sexually Abused: Not on file   Housing Stability:     Unable to Pay for Housing in the Last Year: Not on file    Number of [place] in the Last Year: Not on file    Unstable Housing in the Last Year: Not on file        Family History   Problem Relation Age of Onset    Heart Disease Mother     Other Father         MS    Cancer Sister 72       REVIEW OF SYSTEMS (New symptoms):    Eyes:      Blurred vision:  No [x]/Yes []               Diplopia:   No [x]/Yes []               Vision loss:       No [x]/Yes []   Ears, nose, throat:             Hearing loss:    No [x]/Yes []      Vertigo:   No [x]/Yes []                       Swallowing problem:  No [x]/Yes []               Nose bleeds:   No [x]/Yes []      Voice hoarseness:  No [x]/Yes []  Respiratory:             Cough:   No [x]/Yes []      Pleuritic chest pain:  No [x]/Yes []                        Dyspnea:   No [x]/Yes []      Wheezing:   No [x]/Yes []  Cardiovascular: Angina:   No [x]/Yes []      Palpitations:   No [x]/Yes []          Claudication:    No [x]/Yes []      Leg swelling:   No [x]/Yes []  Gastrointestinal:             Nausea or vomiting:  No [x]/Yes []               Abdominal pain:  No [x]/Yes []                     Intestinal bleeding: No [x]/Yes []  Musculoskeletal:             Leg pain:   No [x]/Yes []      Back pain:   No [x]/Yes []                    Weakness:   No [x]/Yes []  Neurologic:             Numbness:   No [x]/Yes []      Paralysis:   No [x]/Yes []                       Headaches:   No [x]/Yes []  Hematologic, lymphatic:   Anemia:   No [x]/Yes []              Bleeding or bruising:  No [x]/Yes []              Fevers or chills: No [x]/Yes []  Endocrine:             Temp intolerance:   No [x]/Yes []                       Polydipsia, polyuria:  No [x]/Yes []  Skin:              Rash:    No [x]/Yes []      Ulcers:   No [x]/Yes []              Abnorm pigment: No [x]/Yes []  :              Frequency/urgency:  No [x]/Yes []      Hematuria:    No [x]/Yes []                      Incontinence:    No [x]/Yes []    PHYSICAL EXAM:  There were no vitals filed for this visit.   General Appearance: alert and oriented to person, place and time, in no acute distress, well developed and well- nourished  Neurologic: no cranial nerve deficit, speech normal  Head: normocephalic and atraumatic  Eyes: extraocular eye movements intact, conjunctivae normal  ENT: external ear and ear canal normal bilaterally, nose without deformity, no carotid bruits  Pulmonary/Chest: normal air movement, no respiratory distress  Cardiovascular: normal rate, regular rhythm, no murmur  Abdomen: non-distended, no masses  Musculoskeletal: no joint deformity or tenderness  Extremities: no leg edema bilaterally  Skin: warm and dry, no rash or erythema    PULSE EXAM      Right      Left   Brachial     Radial 3 3   Femoral     Popliteal     Dorsalis Pedis     Posterior Tibial 3 3   (3=normal, 2=diminished, 1=barely palpable, 4=widened)    RADIOLOGY: [facility] today  Wolm Blind  1955  Date of study: 6/14/22     Indication for study:  AAA  Study : Aortic Ultrasound     Diameter Aorta:                                      Proximal:          cm                                      Mid:                  cm                                      Distal:              4.5 x 4.5 cm                                      Right iliac:       1.4 x 1.4 cm                                      Left iliac:          1.4 x 1.4 cm     Additional comments:      LAST STUDY  6/8/2021  4.5 cm  Rt 1.4  Lt 1.4    Problem List Items Addressed This Visit     None      Visit Diagnoses     Abdominal aortic aneurysm (AAA) without rupture ([address]    -  Primary    Relevant Orders    US DUP ABD PEL RETRO SCROT LIMITED        I reviewed with the patient that from my standpoint he can continue with all normal activities. I will plan to see him again in one year but asked him to call sooner with any new problems. Pt seen and plan reviewed with Dr. Donita Dockery. BRIANNE Chavis - CNP    Return in about 1 year (around 6/14/2023).

## 2023-05-30 DIAGNOSIS — I71.43 INFRARENAL ABDOMINAL AORTIC ANEURYSM (AAA) WITHOUT RUPTURE (HCC): Primary | ICD-10-CM

## 2023-06-20 ENCOUNTER — HOSPITAL ENCOUNTER (OUTPATIENT)
Dept: CARDIOLOGY | Age: 68
Discharge: HOME | End: 2023-06-20
Payer: COMMERCIAL

## 2023-06-20 ENCOUNTER — OFFICE VISIT (OUTPATIENT)
Dept: VASCULAR SURGERY | Age: 68
End: 2023-06-20
Payer: COMMERCIAL

## 2023-06-20 VITALS — HEIGHT: 69 IN | BODY MASS INDEX: 28.14 KG/M2 | WEIGHT: 190 LBS

## 2023-06-20 DIAGNOSIS — I71.43 INFRARENAL ABDOMINAL AORTIC ANEURYSM (AAA) WITHOUT RUPTURE (HCC): ICD-10-CM

## 2023-06-20 DIAGNOSIS — I71.43 INFRARENAL ABDOMINAL AORTIC ANEURYSM (AAA) WITHOUT RUPTURE (HCC): Primary | ICD-10-CM

## 2023-06-20 PROCEDURE — 99213 OFFICE O/P EST LOW 20 MIN: CPT | Performed by: SURGERY

## 2023-06-20 PROCEDURE — 93978 VASCULAR STUDY: CPT

## 2023-06-20 PROCEDURE — 1123F ACP DISCUSS/DSCN MKR DOCD: CPT | Performed by: SURGERY

## 2023-06-20 PROCEDURE — 76775 US EXAM ABDO BACK WALL LIM: CPT | Performed by: SURGERY

## 2023-06-20 NOTE — PROGRESS NOTES
[facility] Heart & Vascular Lab - [facility]    This is a pre read worksheet - prior to official physician interpretation    Emily Carolinajane  1955  Date of study: 6/20/23    Indication for study:  AAA  Study :  Aortic Ultrasound    Diameter Aorta:     Proximal:   cm     Mid:   cm     Distal:  4.6 x 4.5 cm     Right iliac: 1.4 x 1.4 cm     Left iliac: 1.3 x 1.4 cm    Additional comments:       LAST STUDY   6/14/2022  4.5 cm  Rt 1.4  Lt 1.4

## 2023-06-20 NOTE — PROGRESS NOTES
Vascular Surgery Outpatient Progress Note      Chief Complaint   Patient presents with    Check-Up     AAA       HISTORY OF PRESENT ILLNESS:                The patient is a 69 y.o. male who returns for follow-up evaluation of his abdominal aortic aneurysm. He denies any new problems since I saw him last year. He states his PSA remains undetectable. Past Medical History:        Diagnosis Date    Full dentures     Hyperlipidemia     Prostate CA ([address]     Vitamin D deficiency      Past Surgical History:        Procedure Laterality Date    COLONOSCOPY      PROSTATE SURGERY      TONSILLECTOMY      TOOTH EXTRACTION      WISDOM TOOTH EXTRACTION       Current Medications:   Prior to Admission medications    Medication Sig Start Date End Date Taking? Authorizing Provider   levothyroxine (SYNTHROID) 50 MCG tablet Take 1.5 tablets by mouth daily 3/13/18  Yes Historical Provider, MD   simvastatin (ZOCOR) 20 MG tablet Take 2 tablets by mouth daily 4/7/16  Yes Historical Provider, MD   vitamin D 1000 units CAPS Take 1 capsule by mouth daily   Yes Historical Provider, MD     Allergies:  Patient has no known allergies.     Social History     Socioeconomic History    Marital status:      Spouse name: Not on file    Number of children: Not on file    Years of education: Not on file    Highest education level: Not on file   Occupational History    Not on file   Tobacco Use    Smoking status: Every Day     Packs/day: 0.50     Types: Cigarettes    Smokeless tobacco: Never    Tobacco comments:     1-2 cigs in the am-the rest in the evening with beer   Vaping Use    Vaping Use: Never used   Substance and Sexual Activity    Alcohol use: Yes     Comment: 2-3 beers per day    Drug use: Never    Sexual activity: Not on file   Other Topics Concern    Not on file   Social History Narrative    Not on file     Social Determinants of Health     Financial Resource Strain: Not on file   Food Insecurity: Not on file   Transportation Needs:

## 2023-09-12 ENCOUNTER — TELEPHONE (OUTPATIENT)
Dept: VASCULAR SURGERY | Age: 68
End: 2023-09-12

## 2023-09-12 NOTE — TELEPHONE ENCOUNTER
Dr. Eric Barr reviewed the results of pt's recent CT urogram which measured his AAA the same as his ultrasound from 7/2023. Message for a return call in regards to the same left on pt's answering machine.

## 2023-09-12 NOTE — TELEPHONE ENCOUNTER
Pt returned my call regarding results of CT urogram correlating with his last AAA ultrasound. He was instructed to keep his next scheduled follow up with Dr. Sundar Harris and call us prn.

## 2023-12-08 ENCOUNTER — HOSPITAL ENCOUNTER (OUTPATIENT)
Age: 68
Discharge: HOME | End: 2023-12-10

## 2023-12-08 PROCEDURE — 88305 TISSUE EXAM BY PATHOLOGIST: CPT

## 2023-12-08 PROCEDURE — 88112 CYTOPATH CELL ENHANCE TECH: CPT

## 2023-12-11 ENCOUNTER — HOSPITAL ENCOUNTER (OUTPATIENT)
Age: 68
Discharge: HOME | End: 2023-12-13

## 2023-12-12 LAB — NON-GYN CYTOLOGY REPORT: NORMAL

## 2023-12-13 LAB — SURGICAL PATHOLOGY REPORT: NORMAL

## 2024-06-14 DIAGNOSIS — I71.43 INFRARENAL ABDOMINAL AORTIC ANEURYSM (AAA) WITHOUT RUPTURE (HCC): Primary | ICD-10-CM

## 2024-07-02 ENCOUNTER — HOSPITAL ENCOUNTER (OUTPATIENT)
Dept: CARDIOLOGY | Age: 69
Discharge: HOME | End: 2024-07-04
Attending: SURGERY
Payer: MEDICARE

## 2024-07-02 ENCOUNTER — OFFICE VISIT (OUTPATIENT)
Dept: VASCULAR SURGERY | Age: 69
End: 2024-07-02
Payer: MEDICARE

## 2024-07-02 VITALS — WEIGHT: 190 LBS | BODY MASS INDEX: 28.06 KG/M2

## 2024-07-02 DIAGNOSIS — I71.43 INFRARENAL ABDOMINAL AORTIC ANEURYSM (AAA) WITHOUT RUPTURE (HCC): ICD-10-CM

## 2024-07-02 DIAGNOSIS — I71.43 INFRARENAL ABDOMINAL AORTIC ANEURYSM (AAA) WITHOUT RUPTURE (HCC): Primary | ICD-10-CM

## 2024-07-02 LAB
VAS AORTA DIST AP: 4.54 CM
VAS AORTA DIST TR: 4.81 CM
VAS AORTA MID AP: 2.19 CM
VAS AORTA MID TRANS: 2.15 CM
VAS LEFT COM ILIAC AP: 1.35 CM
VAS LEFT COM ILIAC TRANS: 1.23 CM
VAS RIGHT COM ILIAC AP: 1.59 CM
VAS RIGHT COM ILIAC TRANS: 1.48 CM

## 2024-07-02 PROCEDURE — 99213 OFFICE O/P EST LOW 20 MIN: CPT | Performed by: SURGERY

## 2024-07-02 PROCEDURE — 93976 VASCULAR STUDY: CPT

## 2024-07-02 PROCEDURE — 93976 VASCULAR STUDY: CPT | Performed by: SURGERY

## 2024-07-02 PROCEDURE — 1123F ACP DISCUSS/DSCN MKR DOCD: CPT | Performed by: SURGERY

## 2024-07-02 NOTE — PROGRESS NOTES
Vascular Surgery Outpatient Progress Note      Chief Complaint   Patient presents with    Check-Up     aaa       HISTORY OF PRESENT ILLNESS:                The patient is a 68 y.o. male who returns for follow-up evaluation of his abdominal aortic aneurysm.  He denies any new problems since I saw him last year.  He specifically denies any recent hospitalizations or surgeries.  He is considering retiring as he states his job requires too much lifting.    Past Medical History:        Diagnosis Date    Full dentures     Hyperlipidemia     Prostate CA (HCC)     Vitamin D deficiency      Past Surgical History:        Procedure Laterality Date    COLONOSCOPY      CYSTOSCOPY      PROSTATE SURGERY      TONSILLECTOMY      TOOTH EXTRACTION      WISDOM TOOTH EXTRACTION       Current Medications:   Prior to Admission medications    Medication Sig Start Date End Date Taking? Authorizing Provider   levothyroxine (SYNTHROID) 50 MCG tablet Take 1.5 tablets by mouth daily 3/13/18  Yes Shawn Morrison MD   simvastatin (ZOCOR) 20 MG tablet Take 2 tablets by mouth daily 4/7/16  Yes Shawn Morrison MD   vitamin D 1000 units CAPS Take 1 capsule by mouth daily   Yes Shawn Morrison MD     Allergies:  Patient has no known allergies.    Social History     Socioeconomic History    Marital status:      Spouse name: Not on file    Number of children: Not on file    Years of education: Not on file    Highest education level: Not on file   Occupational History    Not on file   Tobacco Use    Smoking status: Every Day     Current packs/day: 0.50     Types: Cigarettes    Smokeless tobacco: Never    Tobacco comments:     1-2 cigs in the am-the rest in the evening with beer   Vaping Use    Vaping Use: Never used   Substance and Sexual Activity    Alcohol use: Yes     Comment: 2-3 beers per day    Drug use: Never    Sexual activity: Not on file   Other Topics Concern    Not on file   Social History Narrative    Not on file

## 2025-07-03 DIAGNOSIS — I71.43 INFRARENAL ABDOMINAL AORTIC ANEURYSM (AAA) WITHOUT RUPTURE: Primary | ICD-10-CM

## 2025-07-08 ENCOUNTER — OFFICE VISIT (OUTPATIENT)
Dept: VASCULAR SURGERY | Age: 70
End: 2025-07-08
Payer: MEDICARE

## 2025-07-08 ENCOUNTER — HOSPITAL ENCOUNTER (OUTPATIENT)
Dept: CARDIOLOGY | Age: 70
Discharge: HOME | End: 2025-07-10
Payer: MEDICARE

## 2025-07-08 DIAGNOSIS — I71.43 INFRARENAL ABDOMINAL AORTIC ANEURYSM (AAA) WITHOUT RUPTURE: Primary | ICD-10-CM

## 2025-07-08 DIAGNOSIS — I71.43 INFRARENAL ABDOMINAL AORTIC ANEURYSM (AAA) WITHOUT RUPTURE: ICD-10-CM

## 2025-07-08 LAB
VAS AORTA DIST AP: 4.77 CM
VAS AORTA DIST TR: 5.05 CM
VAS AORTA MID AP: 2.13 CM
VAS AORTA MID TRANS: 2.13 CM
VAS AORTA PROX AP: 2.12 CM
VAS AORTA PROX TR: 2.1 CM
VAS LEFT COM ILIAC AP: 1.32 CM
VAS LEFT COM ILIAC TRANS: 1.24 CM
VAS RIGHT COM ILIAC AP: 1.56 CM
VAS RIGHT COM ILIAC TRANS: 1.45 CM

## 2025-07-08 PROCEDURE — 1123F ACP DISCUSS/DSCN MKR DOCD: CPT | Performed by: SURGERY

## 2025-07-08 PROCEDURE — 99213 OFFICE O/P EST LOW 20 MIN: CPT | Performed by: SURGERY

## 2025-07-08 PROCEDURE — 93976 VASCULAR STUDY: CPT | Performed by: SURGERY

## 2025-07-08 PROCEDURE — 1159F MED LIST DOCD IN RCRD: CPT | Performed by: SURGERY

## 2025-07-08 PROCEDURE — 93976 VASCULAR STUDY: CPT

## 2025-07-08 NOTE — PROGRESS NOTES
Vascular Surgery Outpatient Progress Note      Chief Complaint   Patient presents with    Circulatory Problem     Follow up AAA       HISTORY OF PRESENT ILLNESS:                The patient is a 69 y.o. male who returns for follow-up evaluation of his abdominal aortic aneurysm.  He had 1 episode of hematuria that required cystoscopy.  He states that he had an area of cystitis from previous radiation following his prostatectomy.  He has denied any problems since.  He denies any abdominal pain.    Past Medical History:        Diagnosis Date    Full dentures     Hyperlipidemia     Prostate CA (HCC)     Vitamin D deficiency      Past Surgical History:        Procedure Laterality Date    COLONOSCOPY      CYSTOSCOPY      PROSTATE SURGERY      TONSILLECTOMY      TOOTH EXTRACTION      WISDOM TOOTH EXTRACTION       Current Medications:   Prior to Admission medications    Medication Sig Start Date End Date Taking? Authorizing Provider   levothyroxine (SYNTHROID) 50 MCG tablet Take 1.5 tablets by mouth daily 3/13/18  Yes Shawn Morrison MD   simvastatin (ZOCOR) 20 MG tablet Take 2 tablets by mouth daily 4/7/16  Yes Shawn Morrison MD   vitamin D 1000 units CAPS Take 1 capsule by mouth daily   Yes Shawn Morrison MD     Allergies:  Patient has no known allergies.    Social History     Socioeconomic History    Marital status:      Spouse name: Not on file    Number of children: Not on file    Years of education: Not on file    Highest education level: Not on file   Occupational History    Not on file   Tobacco Use    Smoking status: Every Day     Current packs/day: 0.50     Types: Cigarettes    Smokeless tobacco: Never    Tobacco comments:     1-2 cigs in the am-the rest in the evening with beer   Vaping Use    Vaping status: Never Used   Substance and Sexual Activity    Alcohol use: Yes     Comment: 2-3 beers per day    Drug use: Never    Sexual activity: Not on file   Other Topics Concern    Not on file